# Patient Record
Sex: MALE | Race: WHITE | NOT HISPANIC OR LATINO | Employment: OTHER | URBAN - METROPOLITAN AREA
[De-identification: names, ages, dates, MRNs, and addresses within clinical notes are randomized per-mention and may not be internally consistent; named-entity substitution may affect disease eponyms.]

---

## 2023-03-21 LAB — PROSTATE SPECIFIC AG (NG/ML) IN SER/PLAS: 0.49 NG/ML (ref 0–4)

## 2023-09-27 PROBLEM — H93.19 TINNITUS: Status: ACTIVE | Noted: 2023-09-27

## 2023-09-27 PROBLEM — E78.5 DYSLIPIDEMIA ASSOCIATED WITH TYPE 2 DIABETES MELLITUS (MULTI): Status: ACTIVE | Noted: 2023-09-27

## 2023-09-27 PROBLEM — N52.9 ERECTILE DYSFUNCTION: Status: ACTIVE | Noted: 2023-09-27

## 2023-09-27 PROBLEM — H90.3 BILATERAL HIGH FREQUENCY SENSORINEURAL HEARING LOSS: Status: ACTIVE | Noted: 2023-09-27

## 2023-09-27 PROBLEM — E11.69 DYSLIPIDEMIA ASSOCIATED WITH TYPE 2 DIABETES MELLITUS (MULTI): Status: ACTIVE | Noted: 2023-09-27

## 2023-09-27 PROBLEM — I15.2 HYPERTENSION ASSOCIATED WITH TYPE 2 DIABETES MELLITUS (MULTI): Status: ACTIVE | Noted: 2023-09-27

## 2023-09-27 PROBLEM — N40.1 ENLARGED PROSTATE WITH LOWER URINARY TRACT SYMPTOMS (LUTS): Status: ACTIVE | Noted: 2023-09-27

## 2023-09-27 PROBLEM — E11.59 HYPERTENSION ASSOCIATED WITH TYPE 2 DIABETES MELLITUS (MULTI): Status: ACTIVE | Noted: 2023-09-27

## 2023-09-27 PROBLEM — J30.2 ALLERGIC RHINITIS, SEASONAL: Status: ACTIVE | Noted: 2023-09-27

## 2023-09-27 PROBLEM — N40.0 PROSTATIC HYPERPLASIA, BENIGN LOCALIZED: Status: ACTIVE | Noted: 2023-09-27

## 2023-09-27 PROBLEM — I10 ESSENTIAL HYPERTENSION: Status: ACTIVE | Noted: 2023-09-27

## 2023-09-27 PROBLEM — E55.9 VITAMIN D DEFICIENCY: Status: ACTIVE | Noted: 2023-09-27

## 2023-09-27 PROBLEM — E11.9 DIABETES MELLITUS (MULTI): Status: ACTIVE | Noted: 2023-09-27

## 2023-09-27 RX ORDER — PEN NEEDLE, DIABETIC 31 GX5/16"
NEEDLE, DISPOSABLE MISCELLANEOUS
COMMUNITY
Start: 2023-02-13

## 2023-09-27 RX ORDER — TRIAMCINOLONE ACETONIDE 0.25 MG/G
1 CREAM TOPICAL 3 TIMES DAILY
COMMUNITY
Start: 2023-01-11

## 2023-09-27 RX ORDER — INSULIN GLARGINE 100 [IU]/ML
100 INJECTION, SOLUTION SUBCUTANEOUS 2 TIMES DAILY
COMMUNITY
Start: 2022-06-29 | End: 2023-10-27 | Stop reason: SDUPTHER

## 2023-09-27 RX ORDER — TAMSULOSIN HYDROCHLORIDE 0.4 MG/1
1 CAPSULE ORAL DAILY
COMMUNITY
End: 2023-10-05

## 2023-09-27 RX ORDER — BLOOD SUGAR DIAGNOSTIC
STRIP MISCELLANEOUS
COMMUNITY
Start: 2020-09-23 | End: 2024-01-05 | Stop reason: SDUPTHER

## 2023-09-27 RX ORDER — TADALAFIL 20 MG/1
20 TABLET ORAL DAILY PRN
COMMUNITY
Start: 2021-12-14 | End: 2023-10-27 | Stop reason: SDUPTHER

## 2023-09-27 RX ORDER — METFORMIN HYDROCHLORIDE 1000 MG/1
1 TABLET ORAL
COMMUNITY
Start: 2018-07-16 | End: 2023-10-27 | Stop reason: SDUPTHER

## 2023-09-27 RX ORDER — ROSUVASTATIN CALCIUM 20 MG/1
20 TABLET, COATED ORAL DAILY
COMMUNITY
Start: 2018-07-16 | End: 2023-10-27 | Stop reason: SDUPTHER

## 2023-09-27 RX ORDER — LISINOPRIL AND HYDROCHLOROTHIAZIDE 20; 25 MG/1; MG/1
1 TABLET ORAL 2 TIMES DAILY
COMMUNITY
End: 2023-10-27 | Stop reason: SDUPTHER

## 2023-09-29 PROBLEM — L98.499: Status: ACTIVE | Noted: 2023-09-29

## 2023-09-29 PROBLEM — L71.8 ROSACEA KERATITIS: Status: ACTIVE | Noted: 2023-09-29

## 2023-09-29 PROBLEM — D22.9 ATYPICAL MOLE: Status: ACTIVE | Noted: 2023-09-29

## 2023-09-29 PROBLEM — F43.21 GRIEF REACTION: Status: ACTIVE | Noted: 2023-09-29

## 2023-09-29 PROBLEM — E66.3 OVERWEIGHT: Status: ACTIVE | Noted: 2023-09-29

## 2023-09-29 PROBLEM — F43.20 GRIEF REACTION: Status: ACTIVE | Noted: 2023-09-29

## 2023-09-29 PROBLEM — N32.89 DISTENDED BLADDER: Status: ACTIVE | Noted: 2023-09-29

## 2023-09-29 PROBLEM — R94.31 ABNORMAL EKG: Status: ACTIVE | Noted: 2023-09-29

## 2023-09-29 PROBLEM — K63.5 POLYP, COLONIC: Status: ACTIVE | Noted: 2023-09-29

## 2023-09-29 PROBLEM — R35.1 NOCTURIA: Status: ACTIVE | Noted: 2023-09-29

## 2023-09-29 PROBLEM — R00.1 SYMPTOMATIC BRADYCARDIA: Status: ACTIVE | Noted: 2023-09-29

## 2023-09-29 RX ORDER — LANCETS
EACH MISCELLANEOUS 2 TIMES DAILY
COMMUNITY
End: 2023-10-04 | Stop reason: SDUPTHER

## 2023-10-04 ENCOUNTER — OFFICE VISIT (OUTPATIENT)
Dept: PRIMARY CARE | Facility: CLINIC | Age: 73
End: 2023-10-04
Payer: MEDICARE

## 2023-10-04 VITALS
WEIGHT: 197.38 LBS | OXYGEN SATURATION: 98 % | TEMPERATURE: 97.9 F | HEART RATE: 75 BPM | HEIGHT: 70 IN | SYSTOLIC BLOOD PRESSURE: 124 MMHG | DIASTOLIC BLOOD PRESSURE: 78 MMHG | BODY MASS INDEX: 28.26 KG/M2

## 2023-10-04 DIAGNOSIS — I15.2 HYPERTENSION ASSOCIATED WITH TYPE 2 DIABETES MELLITUS (MULTI): ICD-10-CM

## 2023-10-04 DIAGNOSIS — E78.5 DYSLIPIDEMIA ASSOCIATED WITH TYPE 2 DIABETES MELLITUS (MULTI): ICD-10-CM

## 2023-10-04 DIAGNOSIS — E11.69 DYSLIPIDEMIA ASSOCIATED WITH TYPE 2 DIABETES MELLITUS (MULTI): ICD-10-CM

## 2023-10-04 DIAGNOSIS — E11.59 HYPERTENSION ASSOCIATED WITH TYPE 2 DIABETES MELLITUS (MULTI): ICD-10-CM

## 2023-10-04 DIAGNOSIS — E11.9 TYPE 2 DIABETES MELLITUS WITHOUT COMPLICATION, UNSPECIFIED WHETHER LONG TERM INSULIN USE (MULTI): Primary | ICD-10-CM

## 2023-10-04 PROCEDURE — 3074F SYST BP LT 130 MM HG: CPT | Performed by: INTERNAL MEDICINE

## 2023-10-04 PROCEDURE — 3078F DIAST BP <80 MM HG: CPT | Performed by: INTERNAL MEDICINE

## 2023-10-04 PROCEDURE — 1159F MED LIST DOCD IN RCRD: CPT | Performed by: INTERNAL MEDICINE

## 2023-10-04 PROCEDURE — 99214 OFFICE O/P EST MOD 30 MIN: CPT | Performed by: INTERNAL MEDICINE

## 2023-10-04 PROCEDURE — 1036F TOBACCO NON-USER: CPT | Performed by: INTERNAL MEDICINE

## 2023-10-04 RX ORDER — LANCETS
EACH MISCELLANEOUS
Qty: 90 EACH | Refills: 1 | Status: SHIPPED | OUTPATIENT
Start: 2023-10-04

## 2023-10-04 RX ORDER — CLINDAMYCIN PHOSPHATE 10 UG/ML
LOTION TOPICAL
COMMUNITY
Start: 2023-09-27

## 2023-10-04 RX ORDER — DOXYCYCLINE 100 MG/1
100 CAPSULE ORAL
COMMUNITY
Start: 2023-09-27

## 2023-10-04 ASSESSMENT — ENCOUNTER SYMPTOMS
DYSURIA: 0
ABDOMINAL PAIN: 0
MYALGIAS: 0
SORE THROAT: 0
LIGHT-HEADEDNESS: 0
ACTIVITY CHANGE: 0
COUGH: 0

## 2023-10-04 ASSESSMENT — PATIENT HEALTH QUESTIONNAIRE - PHQ9
1. LITTLE INTEREST OR PLEASURE IN DOING THINGS: NOT AT ALL
SUM OF ALL RESPONSES TO PHQ9 QUESTIONS 1 AND 2: 0
2. FEELING DOWN, DEPRESSED OR HOPELESS: NOT AT ALL

## 2023-10-04 NOTE — PROGRESS NOTES
"Subjective   Patient ID: Asif Suarez is a 72 y.o. male who presents for Med Management (PATIENT IN OFFICE TODAY FOR 5 MONTH FOLLOW UP.  ).    Asif is overall doing well.  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  Otherwise he has plenty of time for swimming and piano.  He does not have any acute medical complaint.  He is taking his medications right.  His immunizations are up-to-date.  Cancer screening tests are up-to-date.         Review of Systems   Constitutional:  Negative for activity change.   HENT:  Negative for congestion and sore throat.    Respiratory:  Negative for cough.    Cardiovascular:  Negative for chest pain.   Gastrointestinal:  Negative for abdominal pain.   Endocrine: Negative for polyuria.   Genitourinary:  Negative for dysuria.   Musculoskeletal:  Negative for myalgias.   Skin:  Negative for rash.   Neurological:  Negative for light-headedness.   Psychiatric/Behavioral:  Negative for behavioral problems.        Objective   /78 (BP Location: Left arm, Patient Position: Sitting)   Pulse 75   Temp 36.6 °C (97.9 °F) (Temporal)   Ht 1.778 m (5' 10\")   Wt 89.5 kg (197 lb 6 oz)   SpO2 98%   BMI 28.32 kg/m²     Physical Exam  Vitals and nursing note reviewed.   Constitutional:       Appearance: Normal appearance.   HENT:      Head: Normocephalic.      Right Ear: Tympanic membrane normal.      Left Ear: Tympanic membrane normal.      Nose: Nose normal.      Mouth/Throat:      Mouth: Mucous membranes are moist.   Cardiovascular:      Rate and Rhythm: Normal rate and regular rhythm.      Pulses: Normal pulses.      Heart sounds: No murmur heard.  Pulmonary:      Effort: No respiratory distress.      Breath sounds: Normal breath sounds.   Abdominal:      Palpations: Abdomen is soft.   Musculoskeletal:      Cervical back: Neck supple.      Right lower leg: No edema.      Left lower leg: No edema.   Skin:     General: Skin is warm.      Findings: No rash. "   Neurological:      General: No focal deficit present.      Mental Status: He is alert and oriented to person, place, and time.   Psychiatric:         Mood and Affect: Mood normal.         Assessment/Plan   Problem List Items Addressed This Visit             ICD-10-CM       Cardiac and Vasculature    Dyslipidemia associated with type 2 diabetes mellitus (CMS/[facility]) E11.69, E78.5     Patient will have fasting lipid panel today.  He will continue with his Crestor.  We discussed about increased activity.  He does swimming at least 3 times per week.         Hypertension associated with type 2 diabetes mellitus (CMS/[facility]) E11.59, I15.2     Blood pressure is at goal.  He will continue with his current medications.  We discussed about salt free diet.  We discussed about increase exercise.            Endocrine/Metabolic    Diabetes mellitus (CMS/[facility]) - Primary E11.9     Last A1c was 6.9.  He is due to have another A1c.  A1c has been ordered.  Meanwhile patient is checking his home blood sugar.  His fasting average is about 110 mg per DL         Relevant Medications    lancets (Accu-Chek Softclix Lancets) misc    Other Relevant Orders    Hemoglobin A1c    Lipid panel    Comprehensive metabolic panel

## 2023-10-04 NOTE — ASSESSMENT & PLAN NOTE
Blood pressure is at goal.  He will continue with his current medications.  We discussed about salt free diet.  We discussed about increase exercise.

## 2023-10-04 NOTE — ASSESSMENT & PLAN NOTE
Patient will have fasting lipid panel today.  He will continue with his Crestor.  We discussed about increased activity.  He does swimming at least 3 times per week.

## 2023-10-04 NOTE — ASSESSMENT & PLAN NOTE
Last A1c was 6.9.  He is due to have another A1c.  A1c has been ordered.  Meanwhile patient is checking his home blood sugar.  His fasting average is about 110 mg per DL

## 2023-10-06 ENCOUNTER — LAB (OUTPATIENT)
Dept: LAB | Facility: LAB | Age: 73
End: 2023-10-06
Payer: MEDICARE

## 2023-10-06 DIAGNOSIS — E11.9 TYPE 2 DIABETES MELLITUS WITHOUT COMPLICATION, UNSPECIFIED WHETHER LONG TERM INSULIN USE (MULTI): ICD-10-CM

## 2023-10-06 LAB
ALBUMIN SERPL BCP-MCNC: 4.6 G/DL (ref 3.4–5)
ALP SERPL-CCNC: 47 U/L (ref 33–136)
ALT SERPL W P-5'-P-CCNC: 33 U/L (ref 10–52)
ANION GAP SERPL CALC-SCNC: 15 MMOL/L (ref 10–20)
AST SERPL W P-5'-P-CCNC: 25 U/L (ref 9–39)
BILIRUB SERPL-MCNC: 0.8 MG/DL (ref 0–1.2)
BUN SERPL-MCNC: 22 MG/DL (ref 6–23)
CALCIUM SERPL-MCNC: 9.7 MG/DL (ref 8.6–10.3)
CHLORIDE SERPL-SCNC: 108 MMOL/L (ref 98–107)
CHOLEST SERPL-MCNC: 122 MG/DL (ref 0–199)
CHOLESTEROL/HDL RATIO: 2.2
CO2 SERPL-SCNC: 30 MMOL/L (ref 21–32)
CREAT SERPL-MCNC: 1.18 MG/DL (ref 0.5–1.3)
EST. AVERAGE GLUCOSE BLD GHB EST-MCNC: 140 MG/DL
GFR SERPL CREATININE-BSD FRML MDRD: 66 ML/MIN/1.73M*2
GLUCOSE SERPL-MCNC: 132 MG/DL (ref 74–99)
HBA1C MFR BLD: 6.5 %
HDLC SERPL-MCNC: 55.4 MG/DL
LDLC SERPL CALC-MCNC: 48 MG/DL (ref 140–190)
NON HDL CHOLESTEROL: 67 MG/DL (ref 0–149)
POTASSIUM SERPL-SCNC: 4.2 MMOL/L (ref 3.5–5.3)
PROT SERPL-MCNC: 7.4 G/DL (ref 6.4–8.2)
SODIUM SERPL-SCNC: 149 MMOL/L (ref 136–145)
TRIGL SERPL-MCNC: 95 MG/DL (ref 0–149)
VLDL: 19 MG/DL (ref 0–40)

## 2023-10-06 PROCEDURE — 36415 COLL VENOUS BLD VENIPUNCTURE: CPT

## 2023-10-06 PROCEDURE — 80061 LIPID PANEL: CPT

## 2023-10-06 PROCEDURE — 80053 COMPREHEN METABOLIC PANEL: CPT

## 2023-10-06 PROCEDURE — 83036 HEMOGLOBIN GLYCOSYLATED A1C: CPT

## 2023-10-27 DIAGNOSIS — N40.1 BENIGN PROSTATIC HYPERPLASIA WITH URINARY FREQUENCY: ICD-10-CM

## 2023-10-27 DIAGNOSIS — E11.69 DYSLIPIDEMIA ASSOCIATED WITH TYPE 2 DIABETES MELLITUS (MULTI): ICD-10-CM

## 2023-10-27 DIAGNOSIS — E11.59 HYPERTENSION ASSOCIATED WITH TYPE 2 DIABETES MELLITUS (MULTI): Primary | ICD-10-CM

## 2023-10-27 DIAGNOSIS — E78.5 DYSLIPIDEMIA ASSOCIATED WITH TYPE 2 DIABETES MELLITUS (MULTI): ICD-10-CM

## 2023-10-27 DIAGNOSIS — R35.0 BENIGN PROSTATIC HYPERPLASIA WITH URINARY FREQUENCY: ICD-10-CM

## 2023-10-27 DIAGNOSIS — N52.9 ERECTILE DYSFUNCTION, UNSPECIFIED ERECTILE DYSFUNCTION TYPE: ICD-10-CM

## 2023-10-27 DIAGNOSIS — I15.2 HYPERTENSION ASSOCIATED WITH TYPE 2 DIABETES MELLITUS (MULTI): Primary | ICD-10-CM

## 2023-10-27 NOTE — TELEPHONE ENCOUNTER
Patient called office requesting refill on pended medication be sent to [facility] with the exception of tadalafil  which needs to be sent to [facility] in [place].     Last OV 10/23  Pending 12/23

## 2023-10-31 RX ORDER — INSULIN GLARGINE 100 [IU]/ML
15 INJECTION, SOLUTION SUBCUTANEOUS 2 TIMES DAILY
Qty: 15 ML | Refills: 1 | Status: SHIPPED | OUTPATIENT
Start: 2023-10-31 | End: 2024-03-11 | Stop reason: SDUPTHER

## 2023-10-31 RX ORDER — ROSUVASTATIN CALCIUM 20 MG/1
20 TABLET, COATED ORAL DAILY
Qty: 90 TABLET | Refills: 1 | Status: SHIPPED | OUTPATIENT
Start: 2023-10-31

## 2023-10-31 RX ORDER — TADALAFIL 20 MG/1
20 TABLET ORAL DAILY PRN
Qty: 10 TABLET | Refills: 1 | Status: SHIPPED | OUTPATIENT
Start: 2023-10-31

## 2023-10-31 RX ORDER — LISINOPRIL AND HYDROCHLOROTHIAZIDE 20; 25 MG/1; MG/1
1 TABLET ORAL 2 TIMES DAILY
Qty: 90 TABLET | Refills: 1 | Status: SHIPPED | OUTPATIENT
Start: 2023-10-31 | End: 2024-03-11 | Stop reason: SDUPTHER

## 2023-10-31 RX ORDER — METFORMIN HYDROCHLORIDE 1000 MG/1
1000 TABLET ORAL
Qty: 180 TABLET | Refills: 1 | Status: SHIPPED | OUTPATIENT
Start: 2023-10-31

## 2023-11-10 ENCOUNTER — TELEPHONE (OUTPATIENT)
Dept: PRIMARY CARE | Facility: CLINIC | Age: 73
End: 2023-11-10
Payer: MEDICARE

## 2023-11-10 NOTE — TELEPHONE ENCOUNTER
Patient called office asking if it is suggested to get the RSV vaccination at his age? Patient recently received the recent COVID booster and flu vaccine at the pharmacy, the RSV was offered at the health department but he passed due to wanting Dr. Menard option on the vaccine. Please advise, thanks!

## 2023-11-22 ENCOUNTER — CLINICAL SUPPORT (OUTPATIENT)
Dept: AUDIOLOGY | Facility: CLINIC | Age: 73
End: 2023-11-22
Payer: MEDICARE

## 2023-11-22 ENCOUNTER — OFFICE VISIT (OUTPATIENT)
Dept: OTOLARYNGOLOGY | Facility: CLINIC | Age: 73
End: 2023-11-22
Payer: MEDICARE

## 2023-11-22 DIAGNOSIS — H93.11 TINNITUS OF RIGHT EAR: ICD-10-CM

## 2023-11-22 DIAGNOSIS — H90.3 BILATERAL HIGH FREQUENCY SENSORINEURAL HEARING LOSS: Primary | ICD-10-CM

## 2023-11-22 PROCEDURE — 92567 TYMPANOMETRY: CPT | Performed by: AUDIOLOGIST

## 2023-11-22 PROCEDURE — 1160F RVW MEDS BY RX/DR IN RCRD: CPT | Performed by: OTOLARYNGOLOGY

## 2023-11-22 PROCEDURE — 1159F MED LIST DOCD IN RCRD: CPT | Performed by: OTOLARYNGOLOGY

## 2023-11-22 PROCEDURE — 3044F HG A1C LEVEL LT 7.0%: CPT | Performed by: OTOLARYNGOLOGY

## 2023-11-22 PROCEDURE — 99213 OFFICE O/P EST LOW 20 MIN: CPT | Performed by: OTOLARYNGOLOGY

## 2023-11-22 PROCEDURE — 92553 AUDIOMETRY AIR & BONE: CPT | Performed by: AUDIOLOGIST

## 2023-11-22 PROCEDURE — 1036F TOBACCO NON-USER: CPT | Performed by: OTOLARYNGOLOGY

## 2023-11-22 PROCEDURE — 3048F LDL-C <100 MG/DL: CPT | Performed by: OTOLARYNGOLOGY

## 2023-11-22 NOTE — PROGRESS NOTES
Mr. Suarez, age 72, returned today for a repeat hearing evaluation during his follow up with ENT, Dr. Antonio.  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  A hearing evaluation completed in May revealed high frequency hearing loss, right greater than left.  He reports he still hears the right tinnitus occasionally but that most of the time he is unaware of it.  He denied significant change in hearing since his last evaluation.    Results:  Otoscopy revealed a clear left ear canal and mild, non-occluding cerumen in his right ear. Tympanic membranes were visualized bilaterally.  Tympanometry revealed normal, Type A tympanograms, indicating normal ear canal volume, peak pressure and compliance bilaterally.   Audiometric thresholds revealed a slight sloping to mild high frequency sensorineural hearing loss in his left ear and a slight sloping to moderate high frequency sensorineural hearing loss in his right ear.     Recommendations:  Follow-up with PCP, Dr. Russo, as medically directed.  Follow-up with ENT, Dr. Antonio, as medically directed.  Retest hearing annually to monitor.

## 2023-11-22 NOTE — PROGRESS NOTES
Asif Suarez is a 72 y.o. year old male patient with 6 MONTH FU ON EARS     Patient returns to the office for follow-up on ears.  Patient with complaints of right tinnitus.  Patient prior audiogram was completed in May 2023.  All other ENT related concerns are negative.  There have been no significant changes in past medical or past surgical histories except as mentioned.      Physical Exam:   General appearance: No acute distress. Normal facies. Symmetric facial movement. No gross lesions of the face are noted.  The external ear structures appear normal. The ear canals patent and the tympanic membranes are intact without evidence of air-fluid levels, retraction, or congenital defects.  Anterior rhinoscopy notes essentially a midline nasal septum. Examination is noted for normal healthy mucosal membranes without any evidence of lesions, polyps, or exudate. The tongue is normally mobile. There are no lesions on the gingiva, buccal, or oral mucosa. There are no oral cavity masses.  The neck is negative for mass lymphadenopathy. The trachea and parotid are clear. The thyroid bed is grossly unremarkable. The salivary gland structures are grossly unremarkable.    Audiogram demonstrates bilateral sensorineural hearing loss with asymmetry in the high frequencies right greater than left.  Left with mild high-frequency loss above 4000 Hz right with moderate loss above 4000 Hz.    Assessment/Plan     Bilateral sensorineural hearing loss  Patient seen in the office today for assessment of ears.  Patient with bilateral sensorineural hearing loss and tinnitus.  The right ear is stable and left with slight decline at 6000 to 8000 Hz.  Recommendation at this time is observation.  We discussed coping strategies for tinnitus.  Will see the patient back in 1 year.  All questions were answered in this regard accordingly.

## 2023-12-11 ENCOUNTER — APPOINTMENT (OUTPATIENT)
Dept: UROLOGY | Facility: CLINIC | Age: 73
End: 2023-12-11
Payer: MEDICARE

## 2023-12-12 ENCOUNTER — APPOINTMENT (OUTPATIENT)
Dept: PRIMARY CARE | Facility: CLINIC | Age: 73
End: 2023-12-12
Payer: MEDICARE

## 2023-12-18 ENCOUNTER — APPOINTMENT (OUTPATIENT)
Dept: PRIMARY CARE | Facility: CLINIC | Age: 73
End: 2023-12-18
Payer: MEDICARE

## 2024-01-05 ENCOUNTER — OFFICE VISIT (OUTPATIENT)
Dept: PRIMARY CARE | Facility: CLINIC | Age: 74
End: 2024-01-05
Payer: MEDICARE

## 2024-01-05 VITALS
HEART RATE: 82 BPM | TEMPERATURE: 98.6 F | WEIGHT: 200 LBS | HEIGHT: 70 IN | DIASTOLIC BLOOD PRESSURE: 78 MMHG | SYSTOLIC BLOOD PRESSURE: 120 MMHG | BODY MASS INDEX: 28.63 KG/M2

## 2024-01-05 DIAGNOSIS — Z12.11 ENCOUNTER FOR SCREENING FOR MALIGNANT NEOPLASM OF COLON: ICD-10-CM

## 2024-01-05 DIAGNOSIS — E11.65 TYPE 2 DIABETES MELLITUS WITH HYPERGLYCEMIA, WITH LONG-TERM CURRENT USE OF INSULIN (MULTI): ICD-10-CM

## 2024-01-05 DIAGNOSIS — Z79.4 TYPE 2 DIABETES MELLITUS WITH HYPERGLYCEMIA, WITH LONG-TERM CURRENT USE OF INSULIN (MULTI): ICD-10-CM

## 2024-01-05 DIAGNOSIS — Z00.00 ENCOUNTER FOR SUBSEQUENT ANNUAL WELLNESS VISIT (AWV) IN MEDICARE PATIENT: Primary | ICD-10-CM

## 2024-01-05 LAB — POC HEMOGLOBIN A1C: 7.7 % (ref 4.2–6.5)

## 2024-01-05 PROCEDURE — 1036F TOBACCO NON-USER: CPT | Performed by: INTERNAL MEDICINE

## 2024-01-05 PROCEDURE — 3074F SYST BP LT 130 MM HG: CPT | Performed by: INTERNAL MEDICINE

## 2024-01-05 PROCEDURE — 1159F MED LIST DOCD IN RCRD: CPT | Performed by: INTERNAL MEDICINE

## 2024-01-05 PROCEDURE — 83036 HEMOGLOBIN GLYCOSYLATED A1C: CPT | Performed by: INTERNAL MEDICINE

## 2024-01-05 PROCEDURE — 3078F DIAST BP <80 MM HG: CPT | Performed by: INTERNAL MEDICINE

## 2024-01-05 PROCEDURE — 1170F FXNL STATUS ASSESSED: CPT | Performed by: INTERNAL MEDICINE

## 2024-01-05 PROCEDURE — G0439 PPPS, SUBSEQ VISIT: HCPCS | Performed by: INTERNAL MEDICINE

## 2024-01-05 RX ORDER — BLOOD SUGAR DIAGNOSTIC
STRIP MISCELLANEOUS
Qty: 200 STRIP | Refills: 1 | Status: SHIPPED | OUTPATIENT
Start: 2024-01-05 | End: 2024-03-11 | Stop reason: SDUPTHER

## 2024-01-05 ASSESSMENT — PATIENT HEALTH QUESTIONNAIRE - PHQ9
SUM OF ALL RESPONSES TO PHQ9 QUESTIONS 1 AND 2: 0
2. FEELING DOWN, DEPRESSED OR HOPELESS: NOT AT ALL
SUM OF ALL RESPONSES TO PHQ9 QUESTIONS 1 AND 2: 0
1. LITTLE INTEREST OR PLEASURE IN DOING THINGS: NOT AT ALL
1. LITTLE INTEREST OR PLEASURE IN DOING THINGS: NOT AT ALL
2. FEELING DOWN, DEPRESSED OR HOPELESS: NOT AT ALL

## 2024-01-05 ASSESSMENT — ENCOUNTER SYMPTOMS
COUGH: 0
ACTIVITY CHANGE: 0
DEPRESSION: 0
LIGHT-HEADEDNESS: 0
SORE THROAT: 0
MYALGIAS: 0
DYSURIA: 0
ABDOMINAL PAIN: 0

## 2024-01-05 ASSESSMENT — ACTIVITIES OF DAILY LIVING (ADL)
BATHING: INDEPENDENT
TAKING_MEDICATION: INDEPENDENT
GROCERY_SHOPPING: INDEPENDENT
DOING_HOUSEWORK: INDEPENDENT
MANAGING_FINANCES: INDEPENDENT
DRESSING: INDEPENDENT

## 2024-01-05 NOTE — PROGRESS NOTES
"Asif Suarez is otherwise doing well. Chronic medical conditions are stable with current medical regimen. Cognitive functions are intact and stable. Immunizations are age appropriately up-to-date. Today patient does not have any acute medical complaint. We reconciled home medications. . Discussed about the preventative jemal like cancer screening, routine blood work, immunizations. The healthy lifestyle has been reinforced. Encouraged continued avoidance of tobacco alcohol substances of abuse. Functional capacity has been assessed. The depression screening questionnaire has been reviewed. Discussed safety measures and advanced directives, Med refills were sent.  Vital signs reviewed.  The due lab orders, if any were provided.  All the other components of annual wellness has been further narrated below. Patient will return for follow up as per schedule.       Review of Systems   Constitutional:  Negative for activity change.   HENT:  Negative for congestion and sore throat.    Respiratory:  Negative for cough.    Cardiovascular:  Negative for chest pain.   Gastrointestinal:  Negative for abdominal pain.   Endocrine: Negative for polyuria.   Genitourinary:  Negative for dysuria.   Musculoskeletal:  Negative for myalgias.   Skin:  Negative for rash.   Neurological:  Negative for light-headedness.   Psychiatric/Behavioral:  Negative for behavioral problems.        Visit Vitals  /78 (BP Location: Right arm, Patient Position: Sitting, BP Cuff Size: Adult)   Pulse 82   Temp 37 °C (98.6 °F) (Temporal)   Ht 1.778 m (5' 10\")   Wt 90.7 kg (200 lb)   BMI 28.70 kg/m²   Smoking Status Former   BSA 2.12 m²           Wt Readings from Last 10 Encounters:   01/05/24 90.7 kg (200 lb)   10/04/23 89.5 kg (197 lb 6 oz)   04/12/23 89.9 kg (198 lb 3.2 oz)   03/27/23 91.6 kg (201 lb 14.4 oz)   01/11/23 89.8 kg (198 lb)   06/29/22 91 kg (200 lb 9.6 oz)   01/03/22 92.1 kg (203 lb)   12/14/21 92.3 kg (203 lb 6.4 oz)   11/10/21 93.2 " "kg (205 lb 8 oz)   02/06/21 93.1 kg (205 lb 5 oz)        Physical Exam  Vitals and nursing note reviewed.   Constitutional:       Appearance: Normal appearance.   HENT:      Head: Normocephalic.      Right Ear: Tympanic membrane normal.      Left Ear: Tympanic membrane normal.      Nose: Nose normal.      Mouth/Throat:      Mouth: Mucous membranes are moist.   Cardiovascular:      Rate and Rhythm: Normal rate and regular rhythm.      Pulses: Normal pulses.      Heart sounds: No murmur heard.  Pulmonary:      Effort: No respiratory distress.      Breath sounds: Normal breath sounds.   Abdominal:      Palpations: Abdomen is soft.   Musculoskeletal:      Cervical back: Neck supple.      Right lower leg: No edema.      Left lower leg: No edema.   Skin:     General: Skin is warm.      Findings: No rash.   Neurological:      General: No focal deficit present.      Mental Status: He is alert and oriented to person, place, and time.   Psychiatric:         Mood and Affect: Mood normal.            RECENT LABS:  Lab Results   Component Value Date    WBC 4.3 (L) 06/29/2022    HGB 14.9 06/29/2022    HCT 44.1 06/29/2022     06/29/2022    CHOL 122 10/06/2023    TRIG 95 10/06/2023    HDL 55.4 10/06/2023    ALT 33 10/06/2023    AST 25 10/06/2023     (H) 10/06/2023    K 4.2 10/06/2023     (H) 10/06/2023    CREATININE 1.18 10/06/2023    BUN 22 10/06/2023    CO2 30 10/06/2023    TSH 3.03 06/29/2022    PSA 0.49 03/20/2023    HGBA1C 7.7 (A) 01/05/2024       IMAGING:  No results found.     MEDICATIONS:   Current Outpatient Medications   Medication Instructions    BD Ultra-Fine Mini Pen Needle 31 gauge x 3/16\" needle     blood sugar diagnostic (Accu-Chek Guide test strips) strip Use strips to test TWICE DAILY    clindamycin (Cleocin T) 1 % lotion APPLY TOPICALLY TO FACE EVERY MORNING    doxycycline (VIBRAMYCIN) 100 mg, oral, 3 times daily after meals    lancets (Accu-Chek Softclix Lancets) misc Use to check blood sugar " twice a day    Lantus Solostar U-100 Insulin 15 Units, subcutaneous, 2 times daily    lisinopriL-hydrochlorothiazide 20-25 mg tablet 1 tablet, oral, 2 times daily    metFORMIN (GLUCOPHAGE) 1,000 mg, oral, 2 times daily with meals    rosuvastatin (CRESTOR) 20 mg, oral, Daily, TAKE 1 TAB MONDAY , WEDNESDAY AND FRIDAY     tadalafil (CIALIS) 20 mg, oral, Daily PRN, 1 HOUR BEFORE NEEDED    tamsulosin (FLOMAX) 0.4 mg, oral, Daily    triamcinolone (Kenalog) 0.025 % cream 1 Application, Topical, 3 times daily        TODAY'S VISIT  DX:   1. Encounter for subsequent annual wellness visit (AWV) in Medicare patient        2. Encounter for screening for malignant neoplasm of colon  Cologuard® colon cancer screening    Cologuard® colon cancer screening      3. Type 2 diabetes mellitus with hyperglycemia, with long-term current use of insulin (CMS/[facility])  POCT glycosylated hemoglobin (Hb A1C) manually resulted    blood sugar diagnostic (Accu-Chek Guide test strips) strip             MEDICAL DECISION MAKING:   Recent lab work and relevant imaging studies have been reviewed.  Office A1c was 7.7.  We discussed about increase activity and decrease calorie consumption.  Relevant correspondence/notes from specialty consultants were reviewed and discussed with patient.  The current active medical co morbidities have been considered.  Patient's cancer screening tests are up-to-date.  Medication have been sent for refill.  Patient will continue with current medical therapy and plan.  Immunizations are up-to-date.  Relevant orders are in the chart for this visit.  I will see patient back in 3 months.        PS: This note was completed using Dragon voice recognition technology and may include unintended errors with respect to translation of words, typographical errors or grammar errors which may not have been identified while finalizing the chart.

## 2024-01-08 DIAGNOSIS — N40.0 BENIGN PROSTATIC HYPERPLASIA, UNSPECIFIED WHETHER LOWER URINARY TRACT SYMPTOMS PRESENT: ICD-10-CM

## 2024-01-09 ENCOUNTER — APPOINTMENT (OUTPATIENT)
Dept: UROLOGY | Facility: CLINIC | Age: 74
End: 2024-01-09
Payer: MEDICARE

## 2024-02-03 LAB — NONINV COLON CA DNA+OCC BLD SCRN STL QL: NEGATIVE

## 2024-02-09 ENCOUNTER — LAB (OUTPATIENT)
Dept: LAB | Facility: LAB | Age: 74
End: 2024-02-09
Payer: MEDICARE

## 2024-02-09 DIAGNOSIS — N40.0 BENIGN PROSTATIC HYPERPLASIA, UNSPECIFIED WHETHER LOWER URINARY TRACT SYMPTOMS PRESENT: ICD-10-CM

## 2024-02-09 LAB — PSA SERPL-MCNC: 0.56 NG/ML

## 2024-02-09 PROCEDURE — 36415 COLL VENOUS BLD VENIPUNCTURE: CPT

## 2024-02-09 PROCEDURE — 84153 ASSAY OF PSA TOTAL: CPT

## 2024-02-13 ENCOUNTER — OFFICE VISIT (OUTPATIENT)
Dept: UROLOGY | Facility: CLINIC | Age: 74
End: 2024-02-13
Payer: MEDICARE

## 2024-02-13 VITALS
BODY MASS INDEX: 28.97 KG/M2 | HEART RATE: 67 BPM | DIASTOLIC BLOOD PRESSURE: 76 MMHG | HEIGHT: 70 IN | WEIGHT: 202.38 LBS | RESPIRATION RATE: 16 BRPM | SYSTOLIC BLOOD PRESSURE: 115 MMHG

## 2024-02-13 DIAGNOSIS — R35.0 BENIGN PROSTATIC HYPERPLASIA WITH URINARY FREQUENCY: ICD-10-CM

## 2024-02-13 DIAGNOSIS — Z12.5 SCREENING PSA (PROSTATE SPECIFIC ANTIGEN): ICD-10-CM

## 2024-02-13 DIAGNOSIS — N40.1 BENIGN PROSTATIC HYPERPLASIA WITH URINARY FREQUENCY: ICD-10-CM

## 2024-02-13 DIAGNOSIS — R35.1 NOCTURIA: Primary | ICD-10-CM

## 2024-02-13 PROCEDURE — 1160F RVW MEDS BY RX/DR IN RCRD: CPT | Performed by: UROLOGY

## 2024-02-13 PROCEDURE — 1036F TOBACCO NON-USER: CPT | Performed by: UROLOGY

## 2024-02-13 PROCEDURE — 51798 US URINE CAPACITY MEASURE: CPT | Performed by: UROLOGY

## 2024-02-13 PROCEDURE — 3074F SYST BP LT 130 MM HG: CPT | Performed by: UROLOGY

## 2024-02-13 PROCEDURE — 1126F AMNT PAIN NOTED NONE PRSNT: CPT | Performed by: UROLOGY

## 2024-02-13 PROCEDURE — 1159F MED LIST DOCD IN RCRD: CPT | Performed by: UROLOGY

## 2024-02-13 PROCEDURE — 99213 OFFICE O/P EST LOW 20 MIN: CPT | Performed by: UROLOGY

## 2024-02-13 PROCEDURE — 3078F DIAST BP <80 MM HG: CPT | Performed by: UROLOGY

## 2024-02-13 RX ORDER — TAMSULOSIN HYDROCHLORIDE 0.4 MG/1
0.4 CAPSULE ORAL DAILY
Qty: 90 CAPSULE | Refills: 3 | Status: SHIPPED | OUTPATIENT
Start: 2024-02-13

## 2024-02-13 ASSESSMENT — ENCOUNTER SYMPTOMS
DYSURIA: 0
FLANK PAIN: 0
DIFFICULTY URINATING: 0
HEMATURIA: 0

## 2024-02-13 ASSESSMENT — PAIN SCALES - GENERAL: PAINLEVEL: 0-NO PAIN

## 2024-02-13 NOTE — PATIENT INSTRUCTIONS
Patient Discussion/Summary     It was very nice to see you once again. 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 We discussed your results, such as a normal PSA and better urinary flow with Flomax.. Please continue your daily Flomax. We will see you again in 1 year.      This note was created with voice-recognition software and was not corrected for typographical or grammatical errors

## 2024-02-13 NOTE — LETTER
"February 13, 2024     Vishnu Russo MD  [address]  [place] [address]    Patient: Asif Suarez   YOB: 1950   Date of Visit: 2/13/2024       Dear Dr. Vishnu Russo MD:    Thank you for referring Asif Suarez to me for evaluation. Below are my notes for this consultation.  If you have questions, please do not hesitate to call me. I look forward to following your patient along with you.       Sincerely,     Rio Concepcion MD      CC: No Recipients  ______________________________________________________________________________________      Provider Impressions     73 year-old white male referred by Dr. Vishnu Russo for \"BPH, DISTENDED BLADDER, ENLARGED PROSTATE WITH LOWER URINARY TRACT SYMPTOMS, NOCTURIA ONLY NON-OLIGURIC ENURESIS.\" PSA 0.60. Ultrasound shows a PVR of 81 cc. The patient has NOCTURIA x6. NIDDM. TRUS reveals this 30 cc size prostate. The patient owns a creamery. No family history of prostate or breast cancer. 15-pack-year cigarette smoking history.     10/31/20, patient arrives alone. He states that previously he was experiencing NOCTURIA Ã--6. Now, since he has started insulin, he has NOCTURIA Ã--1. In addition, Dr. Russo began daily Flomax which is also contributed. We discussed the reasons for NOCTURIA such as uncontrolled diabetes, sleep apnea with snoring, and an enlarged prostate with obstruction. It appears that the combination of better sugar control and an alpha agent such as Flomax has mitigated any of his symptoms at this time. Urinalysis, urine culture and urine cytology were all normal. I do not see any reason for intervention at this time. He will return in 3 months for reevaluation and if normal, then once a year.     02/06/21, patient arrives alone. He continues to do well now that he is being treated for his diabetes and has started Flomax. No complaints. Flow rate of 10 cc/s, total volume 116 cc, PVR is now 0. PSA is 0.60. He will continue on " daily Flomax supplied by Dr. Russo. I will see him again in 1 year.     January 3, 2022, 2:52 PM. Telehealth visit, telephone only. No video. This is in accordance with [facility]-19 stay at home restriction for routine visits. Patient was identified by name and date of birth. Patient states that he was alone for this visit. He was at home and I was in my [place] office. He states that he continues to do very well with his daily Flomax. Good flow, no further nocturia, no further dysuria. His diabetes is now under control under the direction of Dr. Velarde who is also supplying him with his Flomax. PSA is normal at 0.50. We will see him again in 1 year.     March 27, 2023, patient arrives alone. He has no new urologic complaints. His wife was acutely ill last evening. He continues on daily Flomax as prescribed by Dr. Vishnu Russo his primary care physician. PSA 0.49. Flow rate 11 cc/s, total volume 185 cc, PVR 13 cc. 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  He is now retired    February 13, 2024, patient arrives alone.  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  No urinary complaints.  PSA is normal at 0.56.  No flow rate today.  PVR 66 cc.  He continues to receive his Flomax from his PCP.     PLAN:     #1 continue Flomax 0.4 mg by mouth daily as ordered by Dr. Russo     #2 the patient will return in December 2024 with a flow rate, PVR and repeat PSA.    Physical Exam  Vitals and nursing note reviewed.   Constitutional:       Appearance: Normal appearance.   HENT:      Head: Normocephalic and atraumatic.   Pulmonary:      Effort: Pulmonary effort is normal.   Abdominal:      Palpations: Abdomen is soft.      Tenderness: There is no abdominal tenderness.   Musculoskeletal:         General: Normal range of motion.      Cervical back: Normal range of motion and neck supple.   Neurological:      General: No  focal deficit present.      Mental Status: He is alert and oriented to person, place, and time.   Psychiatric:         Mood and Affect: Mood normal.         Behavior: Behavior normal.         This note was created with voice-recognition software and was not corrected for typographical or grammatical errors.

## 2024-02-13 NOTE — PROGRESS NOTES
"  Provider Impressions     73 year-old white male referred by Dr. Vishnu Russo for \"BPH, DISTENDED BLADDER, ENLARGED PROSTATE WITH LOWER URINARY TRACT SYMPTOMS, NOCTURIA ONLY NON-OLIGURIC ENURESIS.\" PSA 0.60. Ultrasound shows a PVR of 81 cc. The patient has NOCTURIA x6. NIDDM. TRUS reveals this 30 cc size prostate. The patient owns a creamery. No family history of prostate or breast cancer. 15-pack-year cigarette smoking history.     10/31/20, patient arrives alone. He states that previously he was experiencing NOCTURIA Ã--6. Now, since he has started insulin, he has NOCTURIA Ã--1. In addition, Dr. Russo began daily Flomax which is also contributed. We discussed the reasons for NOCTURIA such as uncontrolled diabetes, sleep apnea with snoring, and an enlarged prostate with obstruction. It appears that the combination of better sugar control and an alpha agent such as Flomax has mitigated any of his symptoms at this time. Urinalysis, urine culture and urine cytology were all normal. I do not see any reason for intervention at this time. He will return in 3 months for reevaluation and if normal, then once a year.     02/06/21, patient arrives alone. He continues to do well now that he is being treated for his diabetes and has started Flomax. No complaints. Flow rate of 10 cc/s, total volume 116 cc, PVR is now 0. PSA is 0.60. He will continue on daily Flomax supplied by Dr. Russo. I will see him again in 1 year.     January 3, 2022, 2:52 PM. Telehealth visit, telephone only. No video. This is in accordance with [facility] COVID-19 stay at home restriction for routine visits. Patient was identified by name and date of birth. Patient states that he was alone for this visit. He was at home and I was in my [place] office. He states that he continues to do very well with his daily Flomax. Good flow, no further nocturia, no further dysuria. His diabetes is now under control under the direction of Dr. Velarde " who is also supplying him with his Flomax. PSA is normal at 0.50. We will see him again in 1 year.     March 27, 2023, patient arrives alone. He has no new urologic complaints. His wife was acutely ill last evening. He continues on daily Flomax as prescribed by Dr. Vishnu Russo his primary care physician. PSA 0.49. Flow rate 11 cc/s, total volume 185 cc, PVR 13 cc. 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  No urinary complaints.  PSA is normal at 0.56.  No flow rate today.  PVR 66 cc.  He continues to receive his Flomax from his PCP.     PLAN:     #1 continue Flomax 0.4 mg by mouth daily as ordered by Dr. Russo     #2 the patient will return in December 2024 with a flow rate, PVR and repeat PSA.    Physical Exam  Vitals and nursing note reviewed.   Constitutional:       Appearance: Normal appearance.   HENT:      Head: Normocephalic and atraumatic.   Pulmonary:      Effort: Pulmonary effort is normal.   Abdominal:      Palpations: Abdomen is soft.      Tenderness: There is no abdominal tenderness.   Musculoskeletal:         General: Normal range of motion.      Cervical back: Normal range of motion and neck supple.   Neurological:      General: No focal deficit present.      Mental Status: He is alert and oriented to person, place, and time.   Psychiatric:         Mood and Affect: Mood normal.         Behavior: Behavior normal.         This note was created with voice-recognition software and was not corrected for typographical or grammatical errors.

## 2024-02-13 NOTE — PROGRESS NOTES
Patient denies any pain today. Patient has not had any recent surgeries or hospital admits. Patient denies any concerns about falling or safety. Patient has no new urinary issues. Patient taking Flomax as directed. CV     Review of Systems   Genitourinary:  Negative for decreased urine volume, difficulty urinating, dysuria, enuresis, flank pain, hematuria and urgency.   All other systems reviewed and are negative.

## 2024-03-11 DIAGNOSIS — E11.59 HYPERTENSION ASSOCIATED WITH TYPE 2 DIABETES MELLITUS (MULTI): ICD-10-CM

## 2024-03-11 DIAGNOSIS — E78.5 DYSLIPIDEMIA ASSOCIATED WITH TYPE 2 DIABETES MELLITUS (MULTI): ICD-10-CM

## 2024-03-11 DIAGNOSIS — Z79.4 TYPE 2 DIABETES MELLITUS WITH HYPERGLYCEMIA, WITH LONG-TERM CURRENT USE OF INSULIN (MULTI): ICD-10-CM

## 2024-03-11 DIAGNOSIS — I15.2 HYPERTENSION ASSOCIATED WITH TYPE 2 DIABETES MELLITUS (MULTI): ICD-10-CM

## 2024-03-11 DIAGNOSIS — E11.65 TYPE 2 DIABETES MELLITUS WITH HYPERGLYCEMIA, WITH LONG-TERM CURRENT USE OF INSULIN (MULTI): ICD-10-CM

## 2024-03-11 DIAGNOSIS — E11.69 DYSLIPIDEMIA ASSOCIATED WITH TYPE 2 DIABETES MELLITUS (MULTI): ICD-10-CM

## 2024-03-12 RX ORDER — INSULIN GLARGINE 100 [IU]/ML
15 INJECTION, SOLUTION SUBCUTANEOUS 2 TIMES DAILY
Qty: 15 ML | Refills: 1 | Status: SHIPPED | OUTPATIENT
Start: 2024-03-12 | End: 2024-05-09 | Stop reason: SDUPTHER

## 2024-03-12 RX ORDER — BLOOD SUGAR DIAGNOSTIC
STRIP MISCELLANEOUS
Qty: 200 STRIP | Refills: 1 | Status: SHIPPED | OUTPATIENT
Start: 2024-03-12

## 2024-03-12 RX ORDER — LISINOPRIL AND HYDROCHLOROTHIAZIDE 20; 25 MG/1; MG/1
1 TABLET ORAL 2 TIMES DAILY
Qty: 90 TABLET | Refills: 1 | Status: SHIPPED | OUTPATIENT
Start: 2024-03-12

## 2024-04-18 ENCOUNTER — APPOINTMENT (OUTPATIENT)
Dept: PRIMARY CARE | Facility: CLINIC | Age: 74
End: 2024-04-18
Payer: MEDICARE

## 2024-04-19 ENCOUNTER — OFFICE VISIT (OUTPATIENT)
Dept: PRIMARY CARE | Facility: CLINIC | Age: 74
End: 2024-04-19
Payer: MEDICARE

## 2024-04-19 VITALS
SYSTOLIC BLOOD PRESSURE: 122 MMHG | OXYGEN SATURATION: 96 % | HEIGHT: 70 IN | WEIGHT: 200 LBS | HEART RATE: 64 BPM | TEMPERATURE: 97.6 F | BODY MASS INDEX: 28.63 KG/M2 | DIASTOLIC BLOOD PRESSURE: 82 MMHG

## 2024-04-19 DIAGNOSIS — E11.9 TYPE 2 DIABETES MELLITUS WITHOUT COMPLICATION, WITHOUT LONG-TERM CURRENT USE OF INSULIN (MULTI): Primary | ICD-10-CM

## 2024-04-19 PROBLEM — L20.9 ATOPIC DERMATITIS: Status: ACTIVE | Noted: 2024-04-19

## 2024-04-19 PROBLEM — N32.89 BLADDER DISTENTION: Status: ACTIVE | Noted: 2023-09-29

## 2024-04-19 PROBLEM — D22.9 MELANOCYTIC NEVUS: Status: ACTIVE | Noted: 2023-09-29

## 2024-04-19 PROBLEM — K63.5 POLYP OF COLON: Status: ACTIVE | Noted: 2023-09-29

## 2024-04-19 PROBLEM — E66.3 OVERWEIGHT WITH BODY MASS INDEX (BMI) OF 28 TO 28.9 IN ADULT: Status: ACTIVE | Noted: 2018-03-19

## 2024-04-19 PROBLEM — I10 PRIMARY HYPERTENSION: Status: ACTIVE | Noted: 2018-03-19

## 2024-04-19 PROBLEM — E78.5 HYPERLIPIDEMIA: Status: ACTIVE | Noted: 2024-04-19

## 2024-04-19 PROBLEM — N40.0 BENIGN PROSTATIC HYPERPLASIA: Status: ACTIVE | Noted: 2023-09-27

## 2024-04-19 PROBLEM — J30.2 SEASONAL ALLERGIC RHINITIS: Status: ACTIVE | Noted: 2023-09-27

## 2024-04-19 LAB — POC HEMOGLOBIN A1C: 7.6 % (ref 4.2–6.5)

## 2024-04-19 PROCEDURE — 99214 OFFICE O/P EST MOD 30 MIN: CPT | Performed by: INTERNAL MEDICINE

## 2024-04-19 PROCEDURE — 1159F MED LIST DOCD IN RCRD: CPT | Performed by: INTERNAL MEDICINE

## 2024-04-19 PROCEDURE — 3074F SYST BP LT 130 MM HG: CPT | Performed by: INTERNAL MEDICINE

## 2024-04-19 PROCEDURE — 1160F RVW MEDS BY RX/DR IN RCRD: CPT | Performed by: INTERNAL MEDICINE

## 2024-04-19 PROCEDURE — 83036 HEMOGLOBIN GLYCOSYLATED A1C: CPT | Performed by: INTERNAL MEDICINE

## 2024-04-19 PROCEDURE — 3079F DIAST BP 80-89 MM HG: CPT | Performed by: INTERNAL MEDICINE

## 2024-04-19 ASSESSMENT — ENCOUNTER SYMPTOMS
OCCASIONAL FEELINGS OF UNSTEADINESS: 0
LOSS OF SENSATION IN FEET: 0
DEPRESSION: 0

## 2024-04-19 ASSESSMENT — PATIENT HEALTH QUESTIONNAIRE - PHQ9
2. FEELING DOWN, DEPRESSED OR HOPELESS: NOT AT ALL
1. LITTLE INTEREST OR PLEASURE IN DOING THINGS: NOT AT ALL
SUM OF ALL RESPONSES TO PHQ9 QUESTIONS 1 AND 2: 0

## 2024-04-19 NOTE — PROGRESS NOTES
"CHIEF COMPLAINT:    Chief Complaint   Patient presents with    Follow-up     PATIENT HERE FOR 3 MONTH FOLLOW-UP.         HISTORY OF PRESENT ILLNESS:  Asif Suarez  is a pleasant 73-year-old gentleman comes here for 3 months follow-up.  He does have diabetes but he manages his blood sugar very well with increase exercise and decrease calorie consumption.  He does exercise and swim at least 3-4 times per week if not more.  However his hemoglobin A1c today in the office is 7.6%.  Patient is using insulin at 15 to 20 units twice daily.  Otherwise he denies any neuropathy.  He does not have any osmotic symptoms.        Review of Systems   Constitutional:  Negative for activity change.   HENT:  Negative for congestion and sore throat.    Respiratory:  Negative for cough.    Cardiovascular:  Negative for chest pain.   Gastrointestinal:  Negative for abdominal pain.   Endocrine: Negative for polyuria.   Genitourinary:  Negative for dysuria.   Musculoskeletal:  Negative for myalgias.   Skin:  Negative for rash.   Neurological:  Negative for light-headedness.   Psychiatric/Behavioral:  Negative for behavioral problems.      Visit Vitals  /82 (BP Location: Left arm, Patient Position: Sitting, BP Cuff Size: Adult)   Pulse 64   Temp 36.4 °C (97.6 °F) (Temporal)   Ht 1.778 m (5' 10\")   Wt 90.7 kg (200 lb)   SpO2 96%   BMI 28.70 kg/m²   Smoking Status Former   BSA 2.12 m²         Wt Readings from Last 10 Encounters:   04/19/24 90.7 kg (200 lb)   02/13/24 91.8 kg (202 lb 6.1 oz)   01/05/24 90.7 kg (200 lb)   10/04/23 89.5 kg (197 lb 6 oz)   04/12/23 89.9 kg (198 lb 3.2 oz)   03/27/23 91.6 kg (201 lb 14.4 oz)   01/11/23 89.8 kg (198 lb)   06/29/22 91 kg (200 lb 9.6 oz)   01/03/22 92.1 kg (203 lb)   12/14/21 92.3 kg (203 lb 6.4 oz)       Physical Exam  Vitals and nursing note reviewed.   Constitutional:       Appearance: Normal appearance.   HENT:      Head: Normocephalic.      Right Ear: Tympanic membrane normal.      Left " "Ear: Tympanic membrane normal.      Nose: Nose normal.      Mouth/Throat:      Mouth: Mucous membranes are moist.   Cardiovascular:      Rate and Rhythm: Normal rate and regular rhythm.      Pulses: Normal pulses.      Heart sounds: No murmur heard.  Pulmonary:      Effort: No respiratory distress.      Breath sounds: Normal breath sounds.   Abdominal:      Palpations: Abdomen is soft.   Musculoskeletal:      Cervical back: Neck supple.      Right lower leg: No edema.      Left lower leg: No edema.   Skin:     General: Skin is warm.      Findings: No rash.   Neurological:      General: No focal deficit present.      Mental Status: He is alert and oriented to person, place, and time.   Psychiatric:         Mood and Affect: Mood normal.          RECENT LABS:  Lab Results   Component Value Date    WBC 4.3 (L) 06/29/2022    HGB 14.9 06/29/2022    HCT 44.1 06/29/2022     06/29/2022    CHOL 122 10/06/2023    TRIG 95 10/06/2023    HDL 55.4 10/06/2023    ALT 33 10/06/2023    AST 25 10/06/2023     (H) 10/06/2023    K 4.2 10/06/2023     (H) 10/06/2023    CREATININE 1.18 10/06/2023    BUN 22 10/06/2023    CO2 30 10/06/2023    TSH 3.03 06/29/2022    PSA 0.56 02/09/2024    HGBA1C 7.6 (A) 04/19/2024       MEDICATIONS:   Current Outpatient Medications   Medication Instructions    BD Ultra-Fine Mini Pen Needle 31 gauge x 3/16\" needle     blood sugar diagnostic (Accu-Chek Guide test strips) strip Use strips to test TWICE DAILY    clindamycin (Cleocin T) 1 % lotion APPLY TOPICALLY TO FACE EVERY MORNING    doxycycline (VIBRAMYCIN) 100 mg, oral, 3 times daily after meals    empagliflozin (JARDIANCE) 25 mg, oral, Daily    lancets (Accu-Chek Softclix Lancets) misc Use to check blood sugar twice a day    Lantus Solostar U-100 Insulin 15 Units, subcutaneous, 2 times daily    lisinopriL-hydrochlorothiazide 20-25 mg tablet 1 tablet, oral, 2 times daily    metFORMIN (GLUCOPHAGE) 1,000 mg, oral, 2 times daily with meals    " rosuvastatin (CRESTOR) 20 mg, oral, Daily, TAKE 1 TAB MONDAY , WEDNESDAY AND FRIDAY     tadalafil (CIALIS) 20 mg, oral, Daily PRN, 1 HOUR BEFORE NEEDED    tamsulosin (FLOMAX) 0.4 mg, oral, Daily    triamcinolone (Kenalog) 0.025 % cream 1 Application, Topical, 3 times daily        TODAY'S VISIT  DX:   1. Type 2 diabetes mellitus without complication, without long-term current use of insulin (Multi)  POCT glycosylated hemoglobin (Hb A1C) manually resulted    empagliflozin (Jardiance) 25 mg               MEDICAL DECISION MAKING:  - The current and active medical co morbidities have been considered.   - Recent lab work and relevant imaging studies have been reviewed.    - Relevant correspondence/notes from other specialty consultants were reviewed.    - Patient was given treatment as per above plan : On top of basal insulin would like to bring Jardiance at this time.  - Medication have been sent for refill.    - Next Follow up in 3 months

## 2024-04-21 ASSESSMENT — ENCOUNTER SYMPTOMS
COUGH: 0
SORE THROAT: 0
ABDOMINAL PAIN: 0
LIGHT-HEADEDNESS: 0
MYALGIAS: 0
ACTIVITY CHANGE: 0
DYSURIA: 0

## 2024-05-09 DIAGNOSIS — E78.5 DYSLIPIDEMIA ASSOCIATED WITH TYPE 2 DIABETES MELLITUS (MULTI): ICD-10-CM

## 2024-05-09 DIAGNOSIS — E11.59 HYPERTENSION ASSOCIATED WITH TYPE 2 DIABETES MELLITUS (MULTI): ICD-10-CM

## 2024-05-09 DIAGNOSIS — E11.69 DYSLIPIDEMIA ASSOCIATED WITH TYPE 2 DIABETES MELLITUS (MULTI): ICD-10-CM

## 2024-05-09 DIAGNOSIS — I15.2 HYPERTENSION ASSOCIATED WITH TYPE 2 DIABETES MELLITUS (MULTI): ICD-10-CM

## 2024-05-09 NOTE — TELEPHONE ENCOUNTER
Patient called the office and requested the script for his Lantus to be changed to reflect 90 days. Patient's co-pay went up on his Lantus and they advised that he can get a 90 day supply for the same amount that he was paying for a 30 day supply. Rx dose changed to reflect 90 days.

## 2024-05-14 RX ORDER — INSULIN GLARGINE 100 [IU]/ML
15 INJECTION, SOLUTION SUBCUTANEOUS 2 TIMES DAILY
Qty: 27 ML | Refills: 1 | Status: SHIPPED | OUTPATIENT
Start: 2024-05-14

## 2024-06-24 DIAGNOSIS — E11.59 HYPERTENSION ASSOCIATED WITH TYPE 2 DIABETES MELLITUS (MULTI): ICD-10-CM

## 2024-06-24 DIAGNOSIS — I15.2 HYPERTENSION ASSOCIATED WITH TYPE 2 DIABETES MELLITUS (MULTI): ICD-10-CM

## 2024-06-24 RX ORDER — LISINOPRIL AND HYDROCHLOROTHIAZIDE 20; 25 MG/1; MG/1
1 TABLET ORAL 2 TIMES DAILY
Qty: 90 TABLET | Refills: 1 | Status: SHIPPED | OUTPATIENT
Start: 2024-06-24

## 2024-07-01 DIAGNOSIS — N52.9 ERECTILE DYSFUNCTION, UNSPECIFIED ERECTILE DYSFUNCTION TYPE: ICD-10-CM

## 2024-07-01 NOTE — TELEPHONE ENCOUNTER
Rec'd message from Pt requesting refill. [facility] called office however never received return call.    Dialed listed number, left voice ms office rec'd call.

## 2024-07-03 RX ORDER — TADALAFIL 20 MG/1
20 TABLET ORAL DAILY PRN
Qty: 10 TABLET | Refills: 1 | Status: SHIPPED | OUTPATIENT
Start: 2024-07-03 | End: 2024-07-05 | Stop reason: SDUPTHER

## 2024-07-05 DIAGNOSIS — N52.9 ERECTILE DYSFUNCTION, UNSPECIFIED ERECTILE DYSFUNCTION TYPE: ICD-10-CM

## 2024-07-05 RX ORDER — TADALAFIL 20 MG/1
20 TABLET ORAL DAILY PRN
Qty: 10 TABLET | Refills: 1 | Status: SHIPPED | OUTPATIENT
Start: 2024-07-05

## 2024-07-05 NOTE — TELEPHONE ENCOUNTER
Pended prescription was sent to the wrong pharmacy, Patient advises that he needs it sent to [facility].

## 2024-07-16 NOTE — TELEPHONE ENCOUNTER
The patient called the office and left a message very upset that he did not get his get his refill. I called the pharmacy and Dr. Russo sent it on 7/5/2024. They filled it and he did not pick it up. I sent the patient a [facility] My chart message stating it  was sent on the 5th and for him to call them.     [facility] LPN

## 2024-07-26 ENCOUNTER — APPOINTMENT (OUTPATIENT)
Dept: PRIMARY CARE | Facility: CLINIC | Age: 74
End: 2024-07-26
Payer: MEDICARE

## 2024-08-06 ENCOUNTER — TELEPHONE (OUTPATIENT)
Dept: PRIMARY CARE | Facility: CLINIC | Age: 74
End: 2024-08-06
Payer: MEDICARE

## 2024-08-06 DIAGNOSIS — E11.59 HYPERTENSION ASSOCIATED WITH TYPE 2 DIABETES MELLITUS (MULTI): ICD-10-CM

## 2024-08-06 DIAGNOSIS — E78.5 DYSLIPIDEMIA ASSOCIATED WITH TYPE 2 DIABETES MELLITUS (MULTI): ICD-10-CM

## 2024-08-06 DIAGNOSIS — I15.2 HYPERTENSION ASSOCIATED WITH TYPE 2 DIABETES MELLITUS (MULTI): ICD-10-CM

## 2024-08-06 DIAGNOSIS — E11.69 DYSLIPIDEMIA ASSOCIATED WITH TYPE 2 DIABETES MELLITUS (MULTI): ICD-10-CM

## 2024-08-06 RX ORDER — METFORMIN HYDROCHLORIDE 1000 MG/1
1000 TABLET ORAL
Qty: 180 TABLET | Refills: 1 | Status: SHIPPED | OUTPATIENT
Start: 2024-08-06 | End: 2024-08-08 | Stop reason: SDUPTHER

## 2024-08-06 NOTE — TELEPHONE ENCOUNTER
The patient called the [facility] his Metformin. Stating he needs a refill . I have sent it to [facility] for approval . He stated he called 2 weeks ago but it was for his tadalafil  not the metformin. I called the pharmacy and they need a new script. I did call the patient and left him a message to call the office back

## 2024-08-08 DIAGNOSIS — E78.5 DYSLIPIDEMIA ASSOCIATED WITH TYPE 2 DIABETES MELLITUS (MULTI): ICD-10-CM

## 2024-08-08 DIAGNOSIS — E11.59 HYPERTENSION ASSOCIATED WITH TYPE 2 DIABETES MELLITUS (MULTI): ICD-10-CM

## 2024-08-08 DIAGNOSIS — E11.69 DYSLIPIDEMIA ASSOCIATED WITH TYPE 2 DIABETES MELLITUS (MULTI): ICD-10-CM

## 2024-08-08 DIAGNOSIS — I15.2 HYPERTENSION ASSOCIATED WITH TYPE 2 DIABETES MELLITUS (MULTI): ICD-10-CM

## 2024-08-08 RX ORDER — METFORMIN HYDROCHLORIDE 1000 MG/1
1000 TABLET ORAL
Qty: 180 TABLET | Refills: 1 | Status: SHIPPED | OUTPATIENT
Start: 2024-08-08

## 2024-08-08 NOTE — TELEPHONE ENCOUNTER
Patient called office requesting why the prescription was canceled. After speaking with [facility] the medication was not canceled and is in the process of being filled today after the shippment arrives. Call was placed to patient to inform, Patient did  not answer a message was left advising patient to call office back.

## 2024-08-08 NOTE — TELEPHONE ENCOUNTER
Spoke to the patient who advised that he does not get his Metformin through [facility]. It is supposed to go to [facility].   New rx pended to physician.   Prescription cancelled at [facility].

## 2024-08-08 NOTE — TELEPHONE ENCOUNTER
Patient called the office and advised that the Metformin is supposed to go to [facility] and not [facility]. Please resend the medication to be [facility].

## 2024-10-03 DIAGNOSIS — E11.59 HYPERTENSION ASSOCIATED WITH TYPE 2 DIABETES MELLITUS (MULTI): ICD-10-CM

## 2024-10-03 DIAGNOSIS — I15.2 HYPERTENSION ASSOCIATED WITH TYPE 2 DIABETES MELLITUS (MULTI): ICD-10-CM

## 2024-10-03 RX ORDER — LISINOPRIL AND HYDROCHLOROTHIAZIDE 20; 25 MG/1; MG/1
1 TABLET ORAL 2 TIMES DAILY
Qty: 180 TABLET | Refills: 1 | Status: SHIPPED | OUTPATIENT
Start: 2024-10-03

## 2024-10-17 ENCOUNTER — APPOINTMENT (OUTPATIENT)
Dept: PRIMARY CARE | Facility: CLINIC | Age: 74
End: 2024-10-17
Payer: MEDICARE

## 2024-10-17 VITALS
HEART RATE: 78 BPM | OXYGEN SATURATION: 96 % | HEIGHT: 70 IN | TEMPERATURE: 98.7 F | SYSTOLIC BLOOD PRESSURE: 134 MMHG | WEIGHT: 196.4 LBS | DIASTOLIC BLOOD PRESSURE: 76 MMHG | BODY MASS INDEX: 28.12 KG/M2

## 2024-10-17 DIAGNOSIS — E11.65 TYPE 2 DIABETES MELLITUS WITH HYPERGLYCEMIA, WITH LONG-TERM CURRENT USE OF INSULIN: Primary | ICD-10-CM

## 2024-10-17 DIAGNOSIS — E78.5 DYSLIPIDEMIA ASSOCIATED WITH TYPE 2 DIABETES MELLITUS (MULTI): ICD-10-CM

## 2024-10-17 DIAGNOSIS — E11.69 DYSLIPIDEMIA ASSOCIATED WITH TYPE 2 DIABETES MELLITUS (MULTI): ICD-10-CM

## 2024-10-17 DIAGNOSIS — L71.8 ROSACEA KERATITIS: ICD-10-CM

## 2024-10-17 DIAGNOSIS — E11.59 HYPERTENSION ASSOCIATED WITH TYPE 2 DIABETES MELLITUS (MULTI): ICD-10-CM

## 2024-10-17 DIAGNOSIS — I15.2 HYPERTENSION ASSOCIATED WITH TYPE 2 DIABETES MELLITUS (MULTI): ICD-10-CM

## 2024-10-17 DIAGNOSIS — Z79.4 TYPE 2 DIABETES MELLITUS WITH HYPERGLYCEMIA, WITH LONG-TERM CURRENT USE OF INSULIN: Primary | ICD-10-CM

## 2024-10-17 LAB — POC HEMOGLOBIN A1C: 7.6 % (ref 4.2–6.5)

## 2024-10-17 PROCEDURE — G2211 COMPLEX E/M VISIT ADD ON: HCPCS | Performed by: INTERNAL MEDICINE

## 2024-10-17 PROCEDURE — 3075F SYST BP GE 130 - 139MM HG: CPT | Performed by: INTERNAL MEDICINE

## 2024-10-17 PROCEDURE — 3008F BODY MASS INDEX DOCD: CPT | Performed by: INTERNAL MEDICINE

## 2024-10-17 PROCEDURE — 83036 HEMOGLOBIN GLYCOSYLATED A1C: CPT | Performed by: INTERNAL MEDICINE

## 2024-10-17 PROCEDURE — 99214 OFFICE O/P EST MOD 30 MIN: CPT | Performed by: INTERNAL MEDICINE

## 2024-10-17 PROCEDURE — 1123F ACP DISCUSS/DSCN MKR DOCD: CPT | Performed by: INTERNAL MEDICINE

## 2024-10-17 PROCEDURE — 1158F ADVNC CARE PLAN TLK DOCD: CPT | Performed by: INTERNAL MEDICINE

## 2024-10-17 PROCEDURE — 3078F DIAST BP <80 MM HG: CPT | Performed by: INTERNAL MEDICINE

## 2024-10-17 PROCEDURE — 1160F RVW MEDS BY RX/DR IN RCRD: CPT | Performed by: INTERNAL MEDICINE

## 2024-10-17 PROCEDURE — 1159F MED LIST DOCD IN RCRD: CPT | Performed by: INTERNAL MEDICINE

## 2024-10-17 RX ORDER — DOXYCYCLINE 100 MG/1
100 CAPSULE ORAL 2 TIMES DAILY
Qty: 20 CAPSULE | Refills: 2 | Status: SHIPPED | OUTPATIENT
Start: 2024-10-17 | End: 2024-12-16

## 2024-10-17 NOTE — PROGRESS NOTES
"Asif Suarez, pleasant 73 y.o. male was seen today:   Chief Complaint   Patient presents with    Follow-up     Patient is here today for a 3 month follow up    Med Refill     Patient is requesting to have their doxycycline refilled       VISIT GONZALOY:    Asif Suarez Comes here for 3-month follow-up.  Patient has diabetes mellitus.  He tries to manage his diet and also subcu insulin.  He also goes for swimming 3 times a week.  Otherwise he does not have any acute medical complaint.  His office A1c today is 7.9% which is same as before.  Patient does have rosacea and needs doxycycline for flareup.  His hypertension and hyperlipidemia is well-managed.  He does not need any medications refilled for those.  His office blood pressure is 134/76 mmHg.  Last A1c was 48.       MEDICATIONS:   Current Outpatient Medications   Medication Instructions    BD Ultra-Fine Mini Pen Needle 31 gauge x 3/16\" needle     blood sugar diagnostic (Accu-Chek Guide test strips) strip Use strips to test TWICE DAILY    clindamycin (Cleocin T) 1 % lotion APPLY TOPICALLY TO FACE EVERY MORNING    doxycycline (VIBRAMYCIN) 100 mg, oral, 2 times daily    lancets (Accu-Chek Softclix Lancets) misc Use to check blood sugar twice a day    Lantus Solostar U-100 Insulin 15 Units, subcutaneous, 2 times daily    lisinopriL-hydrochlorothiazide 20-25 mg tablet 1 tablet, oral, 2 times daily    metFORMIN (GLUCOPHAGE) 1,000 mg, oral, 2 times daily (morning and late afternoon)    rosuvastatin (CRESTOR) 20 mg, oral, Daily, TAKE 1 TAB MONDAY , WEDNESDAY AND FRIDAY     tadalafil (CIALIS) 20 mg, oral, Daily PRN, 1 HOUR BEFORE NEEDED    tamsulosin (FLOMAX) 0.4 mg, oral, Daily    triamcinolone (Kenalog) 0.025 % cream 1 Application, 3 times daily       TODAY'S VISIT  DX:     1. Type 2 diabetes mellitus with hyperglycemia, with long-term current use of insulin  A1c today in the office is 7.9%.      2. Rosacea keratitis  doxycycline (Vibramycin) 100 mg capsule    " "  3. Dyslipidemia associated with type 2 diabetes mellitus (Multi)  Last LDL was 48.  He is on Crestor      4. Hypertension associated with type 2 diabetes mellitus (Multi)  Blood pressure in the office is 134/76 mmHg patient will continue with lisinopril hydrochlorothiazide.           MEDICAL DECISION MAKING:  - Treatment and therapy plan are as above   - Active medical co morbidities have been considered.   - Recent lab work and relevant imaging studies were reviewed.    - Correspondence/notes from specialty consultants were checked.    - Next Follow up in 3 months      Review of Systems   Constitutional:  Negative for activity change.   HENT:  Negative for congestion and sore throat.    Respiratory:  Negative for cough.    Cardiovascular:  Negative for chest pain.   Gastrointestinal:  Negative for abdominal pain.   Endocrine: Negative for polyuria.   Genitourinary:  Negative for dysuria.   Musculoskeletal:  Negative for myalgias.   Skin:  Negative for rash.   Neurological:  Negative for light-headedness.   Psychiatric/Behavioral:  Negative for behavioral problems.      Visit Vitals  /76 (BP Location: Left arm, Patient Position: Sitting, BP Cuff Size: Large adult)   Pulse 78   Temp 37.1 °C (98.7 °F) (Temporal)   Ht 1.778 m (5' 10\")   Wt 89.1 kg (196 lb 6.4 oz)   SpO2 96% Comment: RA   BMI 28.18 kg/m²   Smoking Status Former   BSA 2.1 m²         Wt Readings from Last 10 Encounters:   10/17/24 89.1 kg (196 lb 6.4 oz)   04/19/24 90.7 kg (200 lb)   02/13/24 91.8 kg (202 lb 6.1 oz)   01/05/24 90.7 kg (200 lb)   10/04/23 89.5 kg (197 lb 6 oz)   04/12/23 89.9 kg (198 lb 3.2 oz)   03/27/23 91.6 kg (201 lb 14.4 oz)   01/11/23 89.8 kg (198 lb)   06/29/22 91 kg (200 lb 9.6 oz)   01/03/22 92.1 kg (203 lb)     Physical Exam  Vitals and nursing note reviewed.   Constitutional:       Appearance: Normal appearance.   HENT:      Head: Normocephalic.      Right Ear: Tympanic membrane normal.      Left Ear: Tympanic membrane " normal.      Nose: Nose normal.      Mouth/Throat:      Mouth: Mucous membranes are moist.   Cardiovascular:      Rate and Rhythm: Normal rate and regular rhythm.      Pulses: Normal pulses.      Heart sounds: No murmur heard.  Pulmonary:      Effort: No respiratory distress.      Breath sounds: Normal breath sounds.   Abdominal:      Palpations: Abdomen is soft.   Musculoskeletal:      Cervical back: Neck supple.      Right lower leg: No edema.      Left lower leg: No edema.   Skin:     General: Skin is warm.      Findings: No rash.   Neurological:      General: No focal deficit present.      Mental Status: He is alert and oriented to person, place, and time.   Psychiatric:         Mood and Affect: Mood normal.        RECENT LABS:  Lab Results   Component Value Date    WBC 4.3 (L) 06/29/2022    HGB 14.9 06/29/2022    HCT 44.1 06/29/2022     06/29/2022    CHOL 122 10/06/2023    TRIG 95 10/06/2023    HDL 55.4 10/06/2023    ALT 33 10/06/2023    AST 25 10/06/2023     (H) 10/06/2023    K 4.2 10/06/2023     (H) 10/06/2023    CREATININE 1.18 10/06/2023    BUN 22 10/06/2023    CO2 30 10/06/2023    TSH 3.03 06/29/2022    PSA 0.56 02/09/2024    HGBA1C 7.6 (A) 10/17/2024     Lab Results   Component Value Date    GLUCOSE 132 (H) 10/06/2023    CALCIUM 9.7 10/06/2023     (H) 10/06/2023    K 4.2 10/06/2023    CO2 30 10/06/2023     (H) 10/06/2023    BUN 22 10/06/2023    CREATININE 1.18 10/06/2023      Lab Results   Component Value Date    HGBA1C 7.6 (A) 10/17/2024    HGBA1C 7.6 (A) 04/19/2024    HGBA1C 7.7 (A) 01/05/2024     Lab Results   Component Value Date    LDLCALC 48 (L) 10/06/2023    CREATININE 1.18 10/06/2023             P.S: This note was completed using Dragon voice recognition technology and may include unintended errors with respect to translation of words, typographical errors or grammar errors which may not have been identified while finalizing the chart.

## 2024-10-25 PROBLEM — R00.1 SYMPTOMATIC BRADYCARDIA: Status: RESOLVED | Noted: 2023-09-29 | Resolved: 2024-10-25

## 2024-10-25 PROBLEM — L98.499: Status: RESOLVED | Noted: 2023-09-29 | Resolved: 2024-10-25

## 2024-10-25 PROBLEM — J30.2 SEASONAL ALLERGIC RHINITIS: Status: RESOLVED | Noted: 2023-09-27 | Resolved: 2024-10-25

## 2024-10-25 PROBLEM — R35.1 NOCTURIA: Status: RESOLVED | Noted: 2023-09-29 | Resolved: 2024-10-25

## 2024-10-25 PROBLEM — R94.31 ABNORMAL EKG: Status: RESOLVED | Noted: 2023-09-29 | Resolved: 2024-10-25

## 2024-10-25 PROBLEM — N40.0 BENIGN PROSTATIC HYPERPLASIA: Status: RESOLVED | Noted: 2023-09-27 | Resolved: 2024-10-25

## 2024-10-25 PROBLEM — E66.3 OVERWEIGHT: Status: RESOLVED | Noted: 2023-09-29 | Resolved: 2024-10-25

## 2024-10-25 PROBLEM — L71.9 ROSACEA: Status: ACTIVE | Noted: 2024-10-25

## 2024-10-25 PROBLEM — E78.5 HYPERLIPIDEMIA: Status: RESOLVED | Noted: 2024-04-19 | Resolved: 2024-10-25

## 2024-10-25 PROBLEM — D22.9 MELANOCYTIC NEVUS: Status: RESOLVED | Noted: 2023-09-29 | Resolved: 2024-10-25

## 2024-10-25 PROBLEM — E11.9 TYPE 2 DIABETES MELLITUS: Status: RESOLVED | Noted: 2023-09-27 | Resolved: 2024-10-25

## 2024-10-25 PROBLEM — N32.89 DISTENDED BLADDER: Status: RESOLVED | Noted: 2023-09-29 | Resolved: 2024-10-25

## 2024-10-25 PROBLEM — K63.5 POLYP OF COLON: Status: RESOLVED | Noted: 2023-09-29 | Resolved: 2024-10-25

## 2024-10-25 PROBLEM — L71.9 ROSACEA: Status: RESOLVED | Noted: 2024-10-25 | Resolved: 2024-10-25

## 2024-10-25 PROBLEM — N40.0 PROSTATIC HYPERPLASIA, BENIGN LOCALIZED: Status: RESOLVED | Noted: 2023-09-27 | Resolved: 2024-10-25

## 2024-10-25 ASSESSMENT — ENCOUNTER SYMPTOMS
ACTIVITY CHANGE: 0
DYSURIA: 0
SORE THROAT: 0
MYALGIAS: 0
ABDOMINAL PAIN: 0
LIGHT-HEADEDNESS: 0
COUGH: 0

## 2024-11-19 DIAGNOSIS — I15.2 HYPERTENSION ASSOCIATED WITH TYPE 2 DIABETES MELLITUS (MULTI): ICD-10-CM

## 2024-11-19 DIAGNOSIS — E78.5 DYSLIPIDEMIA ASSOCIATED WITH TYPE 2 DIABETES MELLITUS (MULTI): ICD-10-CM

## 2024-11-19 DIAGNOSIS — E11.69 DYSLIPIDEMIA ASSOCIATED WITH TYPE 2 DIABETES MELLITUS (MULTI): ICD-10-CM

## 2024-11-19 DIAGNOSIS — E11.59 HYPERTENSION ASSOCIATED WITH TYPE 2 DIABETES MELLITUS (MULTI): ICD-10-CM

## 2024-11-19 RX ORDER — METFORMIN HYDROCHLORIDE 1000 MG/1
1000 TABLET ORAL
Qty: 180 TABLET | Refills: 1 | Status: SHIPPED | OUTPATIENT
Start: 2024-11-19

## 2024-11-19 RX ORDER — ROSUVASTATIN CALCIUM 20 MG/1
20 TABLET, COATED ORAL DAILY
Qty: 90 TABLET | Refills: 1 | Status: SHIPPED | OUTPATIENT
Start: 2024-11-19

## 2024-11-19 RX ORDER — INSULIN GLARGINE 100 [IU]/ML
15 INJECTION, SOLUTION SUBCUTANEOUS 2 TIMES DAILY
Qty: 27 ML | Refills: 3 | Status: SHIPPED | OUTPATIENT
Start: 2024-11-19

## 2024-11-19 NOTE — TELEPHONE ENCOUNTER
Last OV: 10-  Pending OV: 3-4-2025    [employer] message sent to report office received message.

## 2024-11-20 ENCOUNTER — APPOINTMENT (OUTPATIENT)
Dept: OTOLARYNGOLOGY | Facility: CLINIC | Age: 74
End: 2024-11-20
Payer: MEDICARE

## 2024-11-22 ENCOUNTER — TELEPHONE (OUTPATIENT)
Dept: PRIMARY CARE | Facility: CLINIC | Age: 74
End: 2024-11-22
Payer: MEDICARE

## 2024-11-22 NOTE — TELEPHONE ENCOUNTER
Pharmacy faxed over clarification form for the patients Rosuvastatin.   There are 2 sets of directions.     Is the patient taking the Rosuvastatin 20 mg once daily?  Or one tablet on Monday, Wednesday and Friday?    We will need to send in a new prescription with the correct directions.  Please advise

## 2024-11-25 ENCOUNTER — CLINICAL SUPPORT (OUTPATIENT)
Dept: AUDIOLOGY | Facility: CLINIC | Age: 74
End: 2024-11-25
Payer: MEDICARE

## 2024-11-25 ENCOUNTER — APPOINTMENT (OUTPATIENT)
Dept: OTOLARYNGOLOGY | Facility: CLINIC | Age: 74
End: 2024-11-25
Payer: MEDICARE

## 2024-11-25 DIAGNOSIS — H90.3 BILATERAL HIGH FREQUENCY SENSORINEURAL HEARING LOSS: Primary | ICD-10-CM

## 2024-11-25 DIAGNOSIS — H61.23 BILATERAL IMPACTED CERUMEN: ICD-10-CM

## 2024-11-25 DIAGNOSIS — H93.11 TINNITUS OF RIGHT EAR: ICD-10-CM

## 2024-11-25 PROCEDURE — 1159F MED LIST DOCD IN RCRD: CPT | Performed by: OTOLARYNGOLOGY

## 2024-11-25 PROCEDURE — 92557 COMPREHENSIVE HEARING TEST: CPT | Performed by: AUDIOLOGIST

## 2024-11-25 PROCEDURE — 92567 TYMPANOMETRY: CPT | Performed by: AUDIOLOGIST

## 2024-11-25 PROCEDURE — 99213 OFFICE O/P EST LOW 20 MIN: CPT | Performed by: OTOLARYNGOLOGY

## 2024-11-25 PROCEDURE — 1160F RVW MEDS BY RX/DR IN RCRD: CPT | Performed by: OTOLARYNGOLOGY

## 2024-11-25 PROCEDURE — 1123F ACP DISCUSS/DSCN MKR DOCD: CPT | Performed by: OTOLARYNGOLOGY

## 2024-11-25 NOTE — TELEPHONE ENCOUNTER
I have called the patient to clarify and to let him know I called in his prescriptions that were sent to the wrong pharmacy,

## 2024-11-25 NOTE — PROGRESS NOTES
Patient returns.  Seeing back today for yearly audiometric surveillance of bilateral hearing loss.  Doing overall fairly well.  No dramatic decline in patient can proceed.  Remaining head neck inquiry is otherwise unremarkable.  No other change in past medical surgical history.    Exam:  No acute distress.  Bilateral cerumen was removed under the microscope with used to curette, alligator forceps, and hydrogen peroxide with suction as necessary. Following the removal, the ear structures appear to be otherwise grossly normal.  Anterior rhinoscopy notes essentially a midline nasal septum. Examination is noted for normal healthy mucosal membranes without any evidence of lesions, polyps, or exudate. The tongue is normally mobile. There are no lesions on the gingiva, buccal, or oral mucosa. There are no oral cavity masses.  The neck is negative for mass lymphadenopathy. The trachea and parotid are clear. The thyroid bed is grossly unremarkable. The salivary gland structures are grossly unremarkable.    Audiogram:  Bilateral sensorineural hearing loss slightly progressive throughout most frequencies.  Consistent with presbycusis type pattern.    Assessment and plan:  Bilateral sensorineural hearing loss with bilateral cerumen impaction.  He was debrided.  Patient has had slight progression and loss but nothing overly worrisome.  Favor observation.  Recheck with me 1 year, sooner with issue.

## 2024-11-27 ENCOUNTER — TELEPHONE (OUTPATIENT)
Dept: PRIMARY CARE | Facility: CLINIC | Age: 74
End: 2024-11-27
Payer: MEDICARE

## 2024-11-27 DIAGNOSIS — E11.69 DYSLIPIDEMIA ASSOCIATED WITH TYPE 2 DIABETES MELLITUS (MULTI): ICD-10-CM

## 2024-11-27 DIAGNOSIS — E78.5 DYSLIPIDEMIA ASSOCIATED WITH TYPE 2 DIABETES MELLITUS (MULTI): ICD-10-CM

## 2024-11-27 NOTE — TELEPHONE ENCOUNTER
Please adjust the directions on the patient crest or to take 1 tablet three times weekly and take out the directions for take 1 tablet daily.  I do not have the nacho to do it my self

## 2024-11-27 NOTE — TELEPHONE ENCOUNTER
I spoke with the patient and he is taking his atorvastatin 1 table Mon, Wed , Fri. I did a verbal for this to his retail pharmacy, I sent a note to [facility] to change the sig in the patient chart,. I could not do this.

## 2024-11-28 RX ORDER — ROSUVASTATIN CALCIUM 20 MG/1
20 TABLET, COATED ORAL
Qty: 36 TABLET | Refills: 3 | Status: SHIPPED | OUTPATIENT
Start: 2024-11-29 | End: 2025-11-29

## 2024-12-04 ENCOUNTER — LAB (OUTPATIENT)
Dept: LAB | Facility: LAB | Age: 74
End: 2024-12-04
Payer: MEDICARE

## 2024-12-04 DIAGNOSIS — Z12.5 SCREENING PSA (PROSTATE SPECIFIC ANTIGEN): ICD-10-CM

## 2024-12-04 LAB — PSA SERPL-MCNC: 0.77 NG/ML

## 2024-12-04 PROCEDURE — G0103 PSA SCREENING: HCPCS

## 2024-12-09 ENCOUNTER — APPOINTMENT (OUTPATIENT)
Dept: UROLOGY | Facility: CLINIC | Age: 74
End: 2024-12-09
Payer: MEDICARE

## 2024-12-09 VITALS
DIASTOLIC BLOOD PRESSURE: 58 MMHG | SYSTOLIC BLOOD PRESSURE: 131 MMHG | WEIGHT: 202.82 LBS | HEART RATE: 67 BPM | BODY MASS INDEX: 29.04 KG/M2 | RESPIRATION RATE: 16 BRPM | HEIGHT: 70 IN

## 2024-12-09 DIAGNOSIS — N40.1 BENIGN PROSTATIC HYPERPLASIA WITH LOWER URINARY TRACT SYMPTOMS, SYMPTOM DETAILS UNSPECIFIED: Primary | ICD-10-CM

## 2024-12-09 DIAGNOSIS — R35.1 NOCTURIA: ICD-10-CM

## 2024-12-09 PROCEDURE — 1036F TOBACCO NON-USER: CPT | Performed by: UROLOGY

## 2024-12-09 PROCEDURE — 3008F BODY MASS INDEX DOCD: CPT | Performed by: UROLOGY

## 2024-12-09 PROCEDURE — G2211 COMPLEX E/M VISIT ADD ON: HCPCS | Performed by: UROLOGY

## 2024-12-09 PROCEDURE — 1160F RVW MEDS BY RX/DR IN RCRD: CPT | Performed by: UROLOGY

## 2024-12-09 PROCEDURE — 3075F SYST BP GE 130 - 139MM HG: CPT | Performed by: UROLOGY

## 2024-12-09 PROCEDURE — 3078F DIAST BP <80 MM HG: CPT | Performed by: UROLOGY

## 2024-12-09 PROCEDURE — 1159F MED LIST DOCD IN RCRD: CPT | Performed by: UROLOGY

## 2024-12-09 PROCEDURE — 1123F ACP DISCUSS/DSCN MKR DOCD: CPT | Performed by: UROLOGY

## 2024-12-09 PROCEDURE — 1126F AMNT PAIN NOTED NONE PRSNT: CPT | Performed by: UROLOGY

## 2024-12-09 PROCEDURE — 99213 OFFICE O/P EST LOW 20 MIN: CPT | Performed by: UROLOGY

## 2024-12-09 ASSESSMENT — ENCOUNTER SYMPTOMS
FREQUENCY: 0
HEMATURIA: 0
DYSURIA: 0
DIFFICULTY URINATING: 0

## 2024-12-09 ASSESSMENT — PAIN SCALES - GENERAL: PAINLEVEL_OUTOF10: 0-NO PAIN

## 2024-12-09 NOTE — PATIENT INSTRUCTIONS
Patient Discussion/Summary     It was very nice to see you once again.  We discussed your results, such as a normal PSA and better urinary flow with Flomax.. Please continue your daily Flomax. We will see you again in 1 year.      This note was created with voice-recognition software and was not corrected for typographical or grammatical errors

## 2024-12-09 NOTE — LETTER
"December 9, 2024     Vishnu Russo MD  [address]  [place] [address]    Patient: Asif Suarez   YOB: 1950   Date of Visit: 12/9/2024       Dear Dr. Vishnu Russo MD:    Thank you for referring Asif Suarez to me for evaluation. Below are my notes for this consultation.  If you have questions, please do not hesitate to call me. I look forward to following your patient along with you.       Sincerely,     Rio Concepcion MD      CC: No Recipients  ______________________________________________________________________________________      Provider Impressions     74 year-old white male referred by Dr. Vishnu Russo for \"BPH, DISTENDED BLADDER, ENLARGED PROSTATE WITH LOWER URINARY TRACT SYMPTOMS, NOCTURIA ONLY NON-OLIGURIC ENURESIS.\" PSA 0.60. Ultrasound shows a PVR of 81 cc. The patient has NOCTURIA x6. NIDDM. TRUS reveals this 30 cc size prostate. The patient owns a creamery. No family history of prostate or breast cancer. 15-pack-year cigarette smoking history.     10/31/20, patient arrives alone. He states that previously he was experiencing NOCTURIA x6. Now, since he has started insulin, he has NOCTURIA x1. In addition, Dr. Russo began daily Flomax which is also contributed. We discussed the reasons for NOCTURIA such as uncontrolled diabetes, sleep apnea with snoring, and an enlarged prostate with obstruction. It appears that the combination of better sugar control and an alpha agent such as Flomax has mitigated any of his symptoms at this time. Urinalysis, urine culture and urine cytology were all normal. I do not see any reason for intervention at this time. He will return in 3 months for reevaluation and if normal, then once a year.     02/06/21, patient arrives alone. He continues to do well now that he is being treated for his diabetes and has started Flomax. No complaints. Flow rate of 10 cc/s, total volume 116 cc, PVR is now 0. PSA is 0.60. He will continue on daily " Flomax supplied by Dr. Russo. I will see him again in 1 year.     January 3, 2022, 2:52 PM. Telehealth visit, telephone only. No video. [facility] for routine visits. Patient was identified by name and date of birth. Patient states that he was alone for this visit. He was at home and I was in my [place] office. He states that he continues to do very well with his daily Flomax. Good flow, no further nocturia, no further dysuria. His diabetes is now under control under the direction of Dr. Velarde who is also supplying him with his Flomax. PSA is normal at 0.50. We will see him again in 1 year.     March 27, 2023, patient arrives alone. He has no new urologic complaints. His wife was acutely ill last evening. He continues on daily Flomax as prescribed by Dr. Vishnu Russo his primary care physician. PSA 0.49. Flow rate 11 cc/s, total volume 185 cc, PVR 13 cc. 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  He is now retired     February 13, 2024, patient arrives alone.  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  No urinary complaints.  PSA is normal at 0.56.  No flow rate today.  PVR 66 cc.  He continues to receive his Flomax from his PCP.    December 9, 2024, patient arrives alone.  No new urologic complaints.  PSA is 0.77.  PVR 61 cc.  He received his Flomax from his PCP Dr. Vishnu Russo.  Will return in 1 year     PLAN:     #1 continue Flomax 0.4 mg by mouth daily as ordered by Dr. Russo     #2 the patient will return in December 2025 with a PVR and repeat PSA.     Physical Exam  Vitals and nursing note reviewed.   Constitutional:       Appearance: Normal appearance.   HENT:      Head: Normocephalic and atraumatic.   Pulmonary:      Effort: Pulmonary effort is normal.   Abdominal:      Palpations: Abdomen is soft.      Tenderness: There is no abdominal  tenderness.   Musculoskeletal:         General: Normal range of motion.      Cervical back: Normal range of motion and neck supple.   Neurological:      General: No focal deficit present.      Mental Status: He is alert and oriented to person, place, and time.   Psychiatric:         Mood and Affect: Mood normal.         Behavior: Behavior normal.        This note was created with voice-recognition software and was not corrected for typographical or grammatical errors.

## 2024-12-09 NOTE — PROGRESS NOTES
"  Provider Impressions     74 year-old white male referred by Dr. Vishnu Russo for \"BPH, DISTENDED BLADDER, ENLARGED PROSTATE WITH LOWER URINARY TRACT SYMPTOMS, NOCTURIA ONLY NON-OLIGURIC ENURESIS.\" PSA 0.60. Ultrasound shows a PVR of 81 cc. The patient has NOCTURIA x6. NIDDM. TRUS reveals this 30 cc size prostate. The patient owns a creamery. No family history of prostate or breast cancer. 15-pack-year cigarette smoking history.     10/31/20, patient arrives alone. He states that previously he was experiencing NOCTURIA x6. Now, since he has started insulin, he has NOCTURIA x1. In addition, Dr. Russo began daily Flomax which is also contributed. We discussed the reasons for NOCTURIA such as uncontrolled diabetes, sleep apnea with snoring, and an enlarged prostate with obstruction. It appears that the combination of better sugar control and an alpha agent such as Flomax has mitigated any of his symptoms at this time. Urinalysis, urine culture and urine cytology were all normal. I do not see any reason for intervention at this time. He will return in 3 months for reevaluation and if normal, then once a year.     02/06/21, patient arrives alone. He continues to do well now that he is being treated for his diabetes and has started Flomax. No complaints. Flow rate of 10 cc/s, total volume 116 cc, PVR is now 0. PSA is 0.60. He will continue on daily Flomax supplied by Dr. Russo. I will see him again in 1 year.     January 3, 2022, 2:52 PM. Telehealth visit, telephone only. No video. This is in accordance with [facility] COVID-19 stay at home restriction for routine visits. Patient was identified by name and date of birth. Patient states that he was alone for this visit. He was at home and I was in my [place] office. He states that he continues to do very well with his daily Flomax. Good flow, no further nocturia, no further dysuria. His diabetes is now under control under the direction of Dr. Velarde who " is also supplying him with his Flomax. PSA is normal at 0.50. We will see him again in 1 year.     March 27, 2023, patient arrives alone. He has no new urologic complaints. His wife was acutely ill last evening. He continues on daily Flomax as prescribed by Dr. Vishnu Russo his primary care physician. PSA 0.49. Flow rate 11 cc/s, total volume 185 cc, PVR 13 cc. 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  No urinary complaints.  PSA is normal at 0.56.  No flow rate today.  PVR 66 cc.  He continues to receive his Flomax from his PCP.    December 9, 2024, patient arrives alone.  No new urologic complaints.  PSA is 0.77.  PVR 61 cc.  He received his Flomax from his PCP Dr. Vishnu Russo.  Will return in 1 year     PLAN:     #1 continue Flomax 0.4 mg by mouth daily as ordered by Dr. Russo     #2 the patient will return in December 2025 with a PVR and repeat PSA.     Physical Exam  Vitals and nursing note reviewed.   Constitutional:       Appearance: Normal appearance.   HENT:      Head: Normocephalic and atraumatic.   Pulmonary:      Effort: Pulmonary effort is normal.   Abdominal:      Palpations: Abdomen is soft.      Tenderness: There is no abdominal tenderness.   Musculoskeletal:         General: Normal range of motion.      Cervical back: Normal range of motion and neck supple.   Neurological:      General: No focal deficit present.      Mental Status: He is alert and oriented to person, place, and time.   Psychiatric:         Mood and Affect: Mood normal.         Behavior: Behavior normal.        This note was created with voice-recognition software and was not corrected for typographical or grammatical errors.

## 2024-12-09 NOTE — PROGRESS NOTES
Patient denies any pain today. Patient has not had any recent surgeries or hospital admits. Patient denies any concerns about falling or safety. Patient has no new urinary issues, nocturia x2, occasional urgency. patient taking flomax as directed. CV    Review of Systems   Genitourinary:  Positive for enuresis and urgency. Negative for difficulty urinating, dysuria, frequency, hematuria, penile pain, penile swelling, scrotal swelling and testicular pain.   All other systems reviewed and are negative.

## 2024-12-11 ENCOUNTER — APPOINTMENT (OUTPATIENT)
Dept: UROLOGY | Facility: CLINIC | Age: 74
End: 2024-12-11
Payer: MEDICARE

## 2025-03-04 ENCOUNTER — APPOINTMENT (OUTPATIENT)
Dept: PRIMARY CARE | Facility: CLINIC | Age: 75
End: 2025-03-04
Payer: MEDICARE

## 2025-03-12 ENCOUNTER — APPOINTMENT (OUTPATIENT)
Dept: PRIMARY CARE | Facility: CLINIC | Age: 75
End: 2025-03-12
Payer: MEDICARE

## 2025-03-12 VITALS
DIASTOLIC BLOOD PRESSURE: 86 MMHG | SYSTOLIC BLOOD PRESSURE: 124 MMHG | HEART RATE: 74 BPM | BODY MASS INDEX: 27.61 KG/M2 | TEMPERATURE: 97.8 F | OXYGEN SATURATION: 98 % | WEIGHT: 197.2 LBS | HEIGHT: 71 IN

## 2025-03-12 DIAGNOSIS — Z00.00 ENCOUNTER FOR SUBSEQUENT ANNUAL WELLNESS VISIT (AWV) IN MEDICARE PATIENT: Primary | ICD-10-CM

## 2025-03-12 DIAGNOSIS — E78.5 DYSLIPIDEMIA ASSOCIATED WITH TYPE 2 DIABETES MELLITUS (MULTI): ICD-10-CM

## 2025-03-12 DIAGNOSIS — Z13.89 SCREENING FOR BLOOD OR PROTEIN IN URINE: ICD-10-CM

## 2025-03-12 DIAGNOSIS — Z13.29 SCREENING FOR THYROID DISORDER: ICD-10-CM

## 2025-03-12 DIAGNOSIS — E11.59 HYPERTENSION ASSOCIATED WITH TYPE 2 DIABETES MELLITUS (MULTI): ICD-10-CM

## 2025-03-12 DIAGNOSIS — E11.65 TYPE 2 DIABETES MELLITUS WITH HYPERGLYCEMIA, WITH LONG-TERM CURRENT USE OF INSULIN: ICD-10-CM

## 2025-03-12 DIAGNOSIS — E11.69 DYSLIPIDEMIA ASSOCIATED WITH TYPE 2 DIABETES MELLITUS (MULTI): ICD-10-CM

## 2025-03-12 DIAGNOSIS — Z79.4 TYPE 2 DIABETES MELLITUS WITH HYPERGLYCEMIA, WITH LONG-TERM CURRENT USE OF INSULIN: ICD-10-CM

## 2025-03-12 DIAGNOSIS — I15.2 HYPERTENSION ASSOCIATED WITH TYPE 2 DIABETES MELLITUS (MULTI): ICD-10-CM

## 2025-03-12 DIAGNOSIS — E55.9 VITAMIN D DEFICIENCY: ICD-10-CM

## 2025-03-12 LAB — POC HEMOGLOBIN A1C: 8.3 % (ref 4.2–6.5)

## 2025-03-12 PROCEDURE — 99214 OFFICE O/P EST MOD 30 MIN: CPT | Performed by: INTERNAL MEDICINE

## 2025-03-12 PROCEDURE — 1170F FXNL STATUS ASSESSED: CPT | Performed by: INTERNAL MEDICINE

## 2025-03-12 PROCEDURE — 1159F MED LIST DOCD IN RCRD: CPT | Performed by: INTERNAL MEDICINE

## 2025-03-12 PROCEDURE — 1123F ACP DISCUSS/DSCN MKR DOCD: CPT | Performed by: INTERNAL MEDICINE

## 2025-03-12 PROCEDURE — 3079F DIAST BP 80-89 MM HG: CPT | Performed by: INTERNAL MEDICINE

## 2025-03-12 PROCEDURE — 1158F ADVNC CARE PLAN TLK DOCD: CPT | Performed by: INTERNAL MEDICINE

## 2025-03-12 PROCEDURE — 3008F BODY MASS INDEX DOCD: CPT | Performed by: INTERNAL MEDICINE

## 2025-03-12 PROCEDURE — G0444 DEPRESSION SCREEN ANNUAL: HCPCS | Performed by: INTERNAL MEDICINE

## 2025-03-12 PROCEDURE — 3074F SYST BP LT 130 MM HG: CPT | Performed by: INTERNAL MEDICINE

## 2025-03-12 PROCEDURE — 1160F RVW MEDS BY RX/DR IN RCRD: CPT | Performed by: INTERNAL MEDICINE

## 2025-03-12 PROCEDURE — 83036 HEMOGLOBIN GLYCOSYLATED A1C: CPT | Performed by: INTERNAL MEDICINE

## 2025-03-12 PROCEDURE — G0439 PPPS, SUBSEQ VISIT: HCPCS | Performed by: INTERNAL MEDICINE

## 2025-03-12 ASSESSMENT — PATIENT HEALTH QUESTIONNAIRE - PHQ9
4. FEELING TIRED OR HAVING LITTLE ENERGY: SEVERAL DAYS
1. LITTLE INTEREST OR PLEASURE IN DOING THINGS: MORE THAN HALF THE DAYS
6. FEELING BAD ABOUT YOURSELF - OR THAT YOU ARE A FAILURE OR HAVE LET YOURSELF OR YOUR FAMILY DOWN: SEVERAL DAYS
2. FEELING DOWN, DEPRESSED OR HOPELESS: MORE THAN HALF THE DAYS
SUM OF ALL RESPONSES TO PHQ9 QUESTIONS 1 AND 2: 4
5. POOR APPETITE OR OVEREATING: NOT AT ALL
10. IF YOU CHECKED OFF ANY PROBLEMS, HOW DIFFICULT HAVE THESE PROBLEMS MADE IT FOR YOU TO DO YOUR WORK, TAKE CARE OF THINGS AT HOME, OR GET ALONG WITH OTHER PEOPLE: SOMEWHAT DIFFICULT
7. TROUBLE CONCENTRATING ON THINGS, SUCH AS READING THE NEWSPAPER OR WATCHING TELEVISION: NOT AT ALL
8. MOVING OR SPEAKING SO SLOWLY THAT OTHER PEOPLE COULD HAVE NOTICED. OR THE OPPOSITE, BEING SO FIGETY OR RESTLESS THAT YOU HAVE BEEN MOVING AROUND A LOT MORE THAN USUAL: NOT AT ALL
9. THOUGHTS THAT YOU WOULD BE BETTER OFF DEAD, OR OF HURTING YOURSELF: NOT AT ALL

## 2025-03-12 ASSESSMENT — ACTIVITIES OF DAILY LIVING (ADL)
DRESSING: INDEPENDENT
GROCERY_SHOPPING: INDEPENDENT
BATHING: INDEPENDENT
DOING_HOUSEWORK: INDEPENDENT
MANAGING_FINANCES: INDEPENDENT
TAKING_MEDICATION: INDEPENDENT

## 2025-03-12 ASSESSMENT — ENCOUNTER SYMPTOMS
LOSS OF SENSATION IN FEET: 0
OCCASIONAL FEELINGS OF UNSTEADINESS: 0
DEPRESSION: 1

## 2025-03-12 NOTE — PROGRESS NOTES
"CHIEF COMPLAINT:   Annual Wellness Checkup       HISTORY OF PRESENT ILLNESS:   Asif Suarez comes for annual medical check up.  No acute medical complaint today. Overall doing well. . Chronic medical conditions are stable with current medical management. Cognitive function is assessed. Immunizations are age appropriate. Home medications have been reconciled. The healthy lifestyle has been reinforced. Encouraged continued avoidance of tobacco, alcohol, poly pharmacy and substances. Functional capacity has been assessed. Discussed about fall risk and safety measures, at home and outside.  Age appropriate cancer screening tests were recommended. Reminded about code status and health care Power of  (POA). Discussed about mental health and wellbeing. The depression screening questionnaire  (PHQ 9) was discussed for 5 mins. Vital signs, recent lab results, imaging studies were reviewed. At the end patient raised the concern about uncontrolled diabetes, hyperlipidemia and hypertension.  A complete physical exams was performed to evaluate those conditions.      Review of Systems   Constitutional:  Negative for activity change.   HENT:  Negative for congestion and sore throat.    Respiratory:  Negative for cough.    Cardiovascular:  Negative for chest pain.   Gastrointestinal:  Negative for abdominal pain.   Endocrine: Negative for polyuria.   Genitourinary:  Negative for dysuria.   Musculoskeletal:  Negative for myalgias.   Skin:  Negative for rash.   Neurological:  Negative for light-headedness.   Psychiatric/Behavioral:  Negative for behavioral problems.        Visit Vitals  /86 (BP Location: Left arm, Patient Position: Sitting, BP Cuff Size: Adult)   Pulse 74   Temp 36.6 °C (97.8 °F) (Temporal)   Ht 1.791 m (5' 10.5\")   Wt 89.4 kg (197 lb 3.2 oz)   SpO2 98% Comment: RA   BMI 27.90 kg/m²   Smoking Status Former   BSA 2.11 m²           Wt Readings from Last 10 Encounters:   03/12/25 89.4 kg (197 lb 3.2 " oz)   12/09/24 92 kg (202 lb 13.2 oz)   10/17/24 89.1 kg (196 lb 6.4 oz)   04/19/24 90.7 kg (200 lb)   02/13/24 91.8 kg (202 lb 6.1 oz)   01/05/24 90.7 kg (200 lb)   10/04/23 89.5 kg (197 lb 6 oz)   04/12/23 89.9 kg (198 lb 3.2 oz)   03/27/23 91.6 kg (201 lb 14.4 oz)   01/11/23 89.8 kg (198 lb)        Physical Exam  Vitals and nursing note reviewed.   Constitutional:       Appearance: Normal appearance.   HENT:      Head: Normocephalic.      Right Ear: Tympanic membrane normal.      Left Ear: Tympanic membrane normal.      Nose: Nose normal.      Mouth/Throat:      Mouth: Mucous membranes are moist.   Cardiovascular:      Rate and Rhythm: Normal rate and regular rhythm.      Pulses: Normal pulses.      Heart sounds: No murmur heard.  Pulmonary:      Effort: No respiratory distress.      Breath sounds: Normal breath sounds.   Abdominal:      Palpations: Abdomen is soft.   Musculoskeletal:      Cervical back: Neck supple.      Right lower leg: No edema.      Left lower leg: No edema.   Skin:     General: Skin is warm.      Findings: No rash.   Neurological:      General: No focal deficit present.      Mental Status: He is alert and oriented to person, place, and time.   Psychiatric:         Mood and Affect: Mood normal.        RECENT LABS:  Lab Results   Component Value Date    WBC 4.3 03/13/2025    HGB 14.5 03/13/2025    HCT 42.3 03/13/2025     03/13/2025    CHOL 167 03/13/2025    TRIG 118 03/13/2025    HDL 51 03/13/2025    ALT 36 03/13/2025    AST 29 03/13/2025     03/13/2025    K 4.1 03/13/2025    CL 97 (L) 03/13/2025    CREATININE 1.17 03/13/2025    BUN 15 03/13/2025    CO2 30 03/13/2025    TSH 2.68 03/13/2025    PSA 0.77 12/04/2024    HGBA1C 8.3 (A) 03/12/2025     Lab Results   Component Value Date    GLUCOSE 148 (H) 03/13/2025    CALCIUM 9.6 03/13/2025     03/13/2025    K 4.1 03/13/2025    CO2 30 03/13/2025    CL 97 (L) 03/13/2025    BUN 15 03/13/2025    CREATININE 1.17 03/13/2025      Lab  "Results   Component Value Date    LDLCALC 95 03/13/2025     Lab Results   Component Value Date    HGBA1C 8.3 (A) 03/12/2025    HGBA1C 7.6 (A) 10/17/2024    HGBA1C 7.6 (A) 04/19/2024     Lab Results   Component Value Date    LDLCALC 95 03/13/2025    CREATININE 1.17 03/13/2025       MEDICATIONS:   Current Outpatient Medications   Medication Instructions    BD Ultra-Fine Mini Pen Needle 31 gauge x 3/16\" needle     blood sugar diagnostic (Accu-Chek Guide test strips) strip Use strips to test TWICE DAILY    clindamycin (Cleocin T) 1 % lotion APPLY TOPICALLY TO FACE EVERY MORNING    lancets (Accu-Chek Softclix Lancets) misc Use to check blood sugar twice a day    Lantus Solostar U-100 Insulin 15 Units, subcutaneous, 2 times daily    lisinopriL-hydrochlorothiazide 20-25 mg tablet 1 tablet, oral, 2 times daily    metFORMIN (GLUCOPHAGE) 1,000 mg, oral, 2 times daily (morning and late afternoon)    rosuvastatin (CRESTOR) 20 mg, oral, Every Mon/Wed/Fri    tadalafil (CIALIS) 20 mg, oral, Daily PRN, 1 HOUR BEFORE NEEDED    tamsulosin (FLOMAX) 0.4 mg, oral, Daily    triamcinolone (Kenalog) 0.025 % cream 1 Application, 3 times daily        TODAY'S VISIT  DX:     1. Encounter for subsequent annual wellness visit (AWV) in Medicare patient  Overall health is stable and at base line. Will continue with current medical therapy and plan.  Immunizations, cancer screening tests are age appropriately up to date.      2. Dyslipidemia associated with type 2 diabetes mellitus (Multi)  Lipid Panel    Lipid Panel      3. Screening for thyroid disorder  Triiodothyronine, Free    TSH with reflex to Free T4 if abnormal    Triiodothyronine, Free    TSH with reflex to Free T4 if abnormal      4. Screening for blood or protein in urine  Urinalysis with Reflex Microscopic    Urinalysis with Reflex Microscopic      5. Hypertension associated with type 2 diabetes mellitus (Multi)  CBC and Auto Differential    CBC and Auto Differential      6. Type 2 " diabetes mellitus with hyperglycemia, with long-term current use of insulin  A1c today in the office is 8.3%     Comprehensive Metabolic Panel    Comprehensive Metabolic Panel      7. Vitamin D deficiency  Vitamin D 25-Hydroxy,Total (for eval of Vitamin D levels)    Vitamin D 25-Hydroxy,Total (for eval of Vitamin D levels)         MEDICAL DECISION MAKING:   - Relevant correspondence/notes from specialty consultants were reviewed.  - Active co morbidities conditions are stable for now.    - Cognitive function stable.    - Immunizations are age appropriately up to date.   - Preventative cancer screening tests are up-to-date.    - F/U in 3 months.       PS: This note was completed using Dragon voice recognition technology and may include unintended errors with respect to translation of words, typographical errors or grammar errors which may not have been identified while finalizing the chart.

## 2025-03-14 LAB
25(OH)D3+25(OH)D2 SERPL-MCNC: 20 NG/ML (ref 30–100)
ALBUMIN SERPL-MCNC: 4.4 G/DL (ref 3.6–5.1)
ALP SERPL-CCNC: 51 U/L (ref 35–144)
ALT SERPL-CCNC: 36 U/L (ref 9–46)
ANION GAP SERPL CALCULATED.4IONS-SCNC: 9 MMOL/L (CALC) (ref 7–17)
AST SERPL-CCNC: 29 U/L (ref 10–35)
BASOPHILS # BLD AUTO: 22 CELLS/UL (ref 0–200)
BASOPHILS NFR BLD AUTO: 0.5 %
BILIRUB SERPL-MCNC: 0.8 MG/DL (ref 0.2–1.2)
BUN SERPL-MCNC: 15 MG/DL (ref 7–25)
CALCIUM SERPL-MCNC: 9.6 MG/DL (ref 8.6–10.3)
CHLORIDE SERPL-SCNC: 97 MMOL/L (ref 98–110)
CHOLEST SERPL-MCNC: 167 MG/DL
CHOLEST/HDLC SERPL: 3.3 (CALC)
CO2 SERPL-SCNC: 30 MMOL/L (ref 20–32)
CREAT SERPL-MCNC: 1.17 MG/DL (ref 0.7–1.28)
EGFRCR SERPLBLD CKD-EPI 2021: 66 ML/MIN/1.73M2
EOSINOPHIL # BLD AUTO: 228 CELLS/UL (ref 15–500)
EOSINOPHIL NFR BLD AUTO: 5.3 %
ERYTHROCYTE [DISTWIDTH] IN BLOOD BY AUTOMATED COUNT: 13.1 % (ref 11–15)
GLUCOSE SERPL-MCNC: 148 MG/DL (ref 65–99)
HCT VFR BLD AUTO: 42.3 % (ref 38.5–50)
HDLC SERPL-MCNC: 51 MG/DL
HGB BLD-MCNC: 14.5 G/DL (ref 13.2–17.1)
LDLC SERPL CALC-MCNC: 95 MG/DL (CALC)
LYMPHOCYTES # BLD AUTO: 1578 CELLS/UL (ref 850–3900)
LYMPHOCYTES NFR BLD AUTO: 36.7 %
MCH RBC QN AUTO: 32 PG (ref 27–33)
MCHC RBC AUTO-ENTMCNC: 34.3 G/DL (ref 32–36)
MCV RBC AUTO: 93.4 FL (ref 80–100)
MONOCYTES # BLD AUTO: 340 CELLS/UL (ref 200–950)
MONOCYTES NFR BLD AUTO: 7.9 %
NEUTROPHILS # BLD AUTO: 2133 CELLS/UL (ref 1500–7800)
NEUTROPHILS NFR BLD AUTO: 49.6 %
NONHDLC SERPL-MCNC: 116 MG/DL (CALC)
PLATELET # BLD AUTO: 224 THOUSAND/UL (ref 140–400)
PMV BLD REES-ECKER: 9.6 FL (ref 7.5–12.5)
POTASSIUM SERPL-SCNC: 4.1 MMOL/L (ref 3.5–5.3)
PROT SERPL-MCNC: 7.1 G/DL (ref 6.1–8.1)
RBC # BLD AUTO: 4.53 MILLION/UL (ref 4.2–5.8)
SODIUM SERPL-SCNC: 136 MMOL/L (ref 135–146)
T3FREE SERPL-MCNC: 3.6 PG/ML (ref 2.3–4.2)
TRIGL SERPL-MCNC: 118 MG/DL
TSH SERPL-ACNC: 2.68 MIU/L (ref 0.4–4.5)
WBC # BLD AUTO: 4.3 THOUSAND/UL (ref 3.8–10.8)

## 2025-03-15 LAB
APPEARANCE UR: CLEAR
BILIRUB UR QL STRIP: NEGATIVE
COLOR UR: NORMAL
GLUCOSE UR QL STRIP: NEGATIVE
HGB UR QL STRIP: NEGATIVE
KETONES UR QL STRIP: NEGATIVE
LEUKOCYTE ESTERASE UR QL STRIP: NEGATIVE
NITRITE UR QL STRIP: NEGATIVE
PH UR STRIP: 5.5 [PH] (ref 5–8)
PROT UR QL STRIP: NEGATIVE
SP GR UR STRIP: 1.02 (ref 1–1.03)

## 2025-03-16 ASSESSMENT — ENCOUNTER SYMPTOMS
ABDOMINAL PAIN: 0
LIGHT-HEADEDNESS: 0
ACTIVITY CHANGE: 0
MYALGIAS: 0
COUGH: 0
SORE THROAT: 0
DYSURIA: 0

## 2025-04-08 DIAGNOSIS — I15.2 HYPERTENSION ASSOCIATED WITH TYPE 2 DIABETES MELLITUS: ICD-10-CM

## 2025-04-08 DIAGNOSIS — E11.59 HYPERTENSION ASSOCIATED WITH TYPE 2 DIABETES MELLITUS: ICD-10-CM

## 2025-04-08 RX ORDER — LISINOPRIL AND HYDROCHLOROTHIAZIDE 20; 25 MG/1; MG/1
1 TABLET ORAL 2 TIMES DAILY
Qty: 180 TABLET | Refills: 3 | Status: SHIPPED | OUTPATIENT
Start: 2025-04-08

## 2025-04-17 DIAGNOSIS — R35.0 BENIGN PROSTATIC HYPERPLASIA WITH URINARY FREQUENCY: ICD-10-CM

## 2025-04-17 DIAGNOSIS — N40.1 BENIGN PROSTATIC HYPERPLASIA WITH URINARY FREQUENCY: ICD-10-CM

## 2025-04-17 RX ORDER — TAMSULOSIN HYDROCHLORIDE 0.4 MG/1
0.4 CAPSULE ORAL DAILY
Qty: 90 CAPSULE | Refills: 3 | Status: SHIPPED | OUTPATIENT
Start: 2025-04-17

## 2025-05-09 DIAGNOSIS — Z79.4 TYPE 2 DIABETES MELLITUS WITH HYPERGLYCEMIA, WITH LONG-TERM CURRENT USE OF INSULIN: ICD-10-CM

## 2025-05-09 DIAGNOSIS — E11.65 TYPE 2 DIABETES MELLITUS WITH HYPERGLYCEMIA, WITH LONG-TERM CURRENT USE OF INSULIN: ICD-10-CM

## 2025-05-17 LAB
ALBUMIN/CREAT UR: 2 MG/G CREAT
CREAT UR-MCNC: 199 MG/DL (ref 20–320)
MICROALBUMIN UR-MCNC: 0.3 MG/DL

## 2025-06-18 ENCOUNTER — APPOINTMENT (OUTPATIENT)
Dept: PRIMARY CARE | Facility: CLINIC | Age: 75
End: 2025-06-18
Payer: MEDICARE

## 2025-06-18 DIAGNOSIS — I15.2 HYPERTENSION ASSOCIATED WITH TYPE 2 DIABETES MELLITUS: ICD-10-CM

## 2025-06-18 DIAGNOSIS — E11.59 HYPERTENSION ASSOCIATED WITH TYPE 2 DIABETES MELLITUS: ICD-10-CM

## 2025-06-18 DIAGNOSIS — E78.5 DYSLIPIDEMIA ASSOCIATED WITH TYPE 2 DIABETES MELLITUS: ICD-10-CM

## 2025-06-18 DIAGNOSIS — E11.69 DYSLIPIDEMIA ASSOCIATED WITH TYPE 2 DIABETES MELLITUS: ICD-10-CM

## 2025-06-19 RX ORDER — METFORMIN HYDROCHLORIDE 1000 MG/1
1000 TABLET ORAL
Qty: 180 TABLET | Refills: 3 | Status: SHIPPED | OUTPATIENT
Start: 2025-06-19 | End: 2026-06-19

## 2025-09-03 DIAGNOSIS — N52.9 ERECTILE DYSFUNCTION, UNSPECIFIED ERECTILE DYSFUNCTION TYPE: ICD-10-CM

## 2025-09-04 RX ORDER — TADALAFIL 20 MG/1
20 TABLET ORAL 3 TIMES WEEKLY
Qty: 10 TABLET | Refills: 11 | Status: SHIPPED | OUTPATIENT
Start: 2025-09-05 | End: 2026-09-05

## 2025-11-26 ENCOUNTER — APPOINTMENT (OUTPATIENT)
Dept: OTOLARYNGOLOGY | Facility: CLINIC | Age: 75
End: 2025-11-26
Payer: MEDICARE

## 2025-12-09 ENCOUNTER — APPOINTMENT (OUTPATIENT)
Dept: UROLOGY | Facility: CLINIC | Age: 75
End: 2025-12-09
Payer: MEDICARE